# Patient Record
Sex: FEMALE | Race: WHITE | NOT HISPANIC OR LATINO | URBAN - METROPOLITAN AREA
[De-identification: names, ages, dates, MRNs, and addresses within clinical notes are randomized per-mention and may not be internally consistent; named-entity substitution may affect disease eponyms.]

---

## 2020-06-26 ENCOUNTER — OFFICE VISIT (OUTPATIENT)
Dept: URBAN - METROPOLITAN AREA CLINIC 113 | Facility: CLINIC | Age: 85
End: 2020-06-26

## 2020-07-25 ENCOUNTER — TELEPHONE ENCOUNTER (OUTPATIENT)
Dept: URBAN - METROPOLITAN AREA CLINIC 13 | Facility: CLINIC | Age: 85
End: 2020-07-25

## 2020-07-25 RX ORDER — EZETIMIBE 10 MG/1
TAKE 1 TABLET DAILY TABLET ORAL
Refills: 0 | OUTPATIENT
End: 2017-04-17

## 2020-07-25 RX ORDER — ASPIRIN 81 MG/1
TAKE 1 TABLET DAILY TABLET, DELAYED RELEASE ORAL
Refills: 0 | OUTPATIENT
End: 2019-12-30

## 2020-07-25 RX ORDER — DOCUSATE CALCIUM 240 MG
TAKE 1 CAPSULE TWICE DAILY CAPSULE ORAL
Refills: 0 | OUTPATIENT
End: 2019-10-03

## 2020-07-25 RX ORDER — DOCUSATE CALCIUM 240 MG
TAKE 1 CAPSULE DAILY PER PATIENT UNKNOWN STRENGTH CAPSULE ORAL
Refills: 0 | OUTPATIENT
End: 2015-04-23

## 2020-07-25 RX ORDER — CICLESONIDE 160 UG/1
INHALE 2 PUFFS TWICE DAILY AEROSOL, METERED RESPIRATORY (INHALATION)
Refills: 0 | OUTPATIENT
End: 2014-09-09

## 2020-07-25 RX ORDER — BISACODYL 10 MG/1
INSERT 1 SUPP 3 TIMES DAILY SUPPOSITORY RECTAL
Refills: 0 | OUTPATIENT
End: 2018-06-01

## 2020-07-25 RX ORDER — MELATONIN 3 MG
TAKE 2 CAPSULE BEDTIME PRN CAPSULE ORAL
Refills: 0 | OUTPATIENT
End: 2017-10-18

## 2020-07-25 RX ORDER — ZOLPIDEM TARTRATE 10 MG/1
TAKE TABLET  1/2 TABLET AT BEDTIME AS NEEDED TABLET, FILM COATED ORAL
Refills: 0 | OUTPATIENT
End: 2016-01-07

## 2020-07-25 RX ORDER — OMEPRAZOLE 40 MG/1
TAKE 1 CAPSULE DAILY CAPSULE, DELAYED RELEASE ORAL
Qty: 90 | Refills: 3 | OUTPATIENT
Start: 2011-09-15 | End: 2020-06-26

## 2020-07-25 RX ORDER — GABAPENTIN 300 MG/1
TAKE CAPSULE TWICE DAILY CAPSULE ORAL
Refills: 0 | OUTPATIENT
End: 2019-10-03

## 2020-07-25 RX ORDER — DIAZEPAM 5 MG/1
TAKE 1 TABLET 3 TIMES DAILY TABLET ORAL
Refills: 0 | OUTPATIENT
Start: 2019-10-02 | End: 2020-06-26

## 2020-07-25 RX ORDER — GINSENG 100 MG
TAKE 1 CAPSULE TWICE DAILY CAPSULE ORAL
Refills: 0 | OUTPATIENT
End: 2019-10-03

## 2020-07-25 RX ORDER — CALCIUM CITRATE/VITAMIN D3 315MG-6.25
TAKE 1 TABLET TWICE DAILY TABLET ORAL
Refills: 0 | OUTPATIENT
Start: 2008-09-12 | End: 2010-08-10

## 2020-07-25 RX ORDER — EZETIMIBE 10 MG/1
TAKE 0.5 TABLET DAILY TABLET ORAL
Refills: 0 | OUTPATIENT
End: 2019-10-03

## 2020-07-25 RX ORDER — PRAMIPEXOLE DIHYDROCHLORIDE 0.5 MG/1
TAKE 1 TABLET TWICE DAILY TABLET ORAL
Refills: 0 | OUTPATIENT
Start: 2017-08-11 | End: 2019-10-03

## 2020-07-25 RX ORDER — METOPROLOL TARTRATE 25 MG/1
TAKE 1 TABLET TWICE DAILY TABLET, FILM COATED ORAL
Refills: 0 | OUTPATIENT
Start: 2017-09-13 | End: 2017-12-26

## 2020-07-25 RX ORDER — PREDNISONE 50 MG/1
ONE TABLET 13 HOURS, 7 HOURS AND 1 HOUR PRIOR TO IV CONTRAST INJECTION TABLET ORAL
Qty: 3 | Refills: 0 | OUTPATIENT
Start: 2016-01-07 | End: 2016-08-15

## 2020-07-25 RX ORDER — BIOTIN 2500 MCG
TAKE 1 CAPSULE DAILY.  PT STATES UNKNOWN DOSAGE CAPSULE ORAL
Refills: 0 | OUTPATIENT
End: 2019-06-20

## 2020-07-25 RX ORDER — MULTIVITAMIN WITH IRON
TAKE 1 TABLET DAILY PT. STATES UNKNOWN DOSE TABLET ORAL
Refills: 0 | OUTPATIENT
Start: 2011-08-04 | End: 2014-05-01

## 2020-07-25 RX ORDER — METHYLCELLULOSE 2 G/10.2G
USE AS DIRECTED POWDER, FOR SOLUTION ORAL
Refills: 0 | OUTPATIENT
End: 2017-12-26

## 2020-07-25 RX ORDER — AMITRIPTYLINE HYDROCHLORIDE 75 MG/1
TAKE 1 TABLET DAILY AS DIRECTED. PER PATIENT UNKNOWN STRENGTH TABLET, FILM COATED ORAL
Refills: 0 | OUTPATIENT
Start: 2011-09-15 | End: 2015-04-23

## 2020-07-25 RX ORDER — CALCIUM CITRATE/VITAMIN D3 315MG-6.25
TAKE AS DIRECTED TABLET ORAL
Refills: 0 | OUTPATIENT
Start: 2011-09-15 | End: 2014-05-01

## 2020-07-25 RX ORDER — LOSARTAN POTASSIUM 50 MG/1
TAKE 1 TABLET DAILY TABLET, FILM COATED ORAL
Refills: 0 | OUTPATIENT
End: 2015-04-23

## 2020-07-25 RX ORDER — METHENAMINE MANDELATE 1000 MG/1
TAKE 1 TABLET EVERY TWELVE HOURS TABLET ORAL
Refills: 0 | OUTPATIENT
End: 2017-10-18

## 2020-07-25 RX ORDER — SUCRALFATE 1 G/1
TAKE 1 TABLET 4 TIMES DAILY BEFORE MEALS AND AT BEDTIME AS NEEDED TABLET ORAL
Qty: 120 | Refills: 2 | OUTPATIENT
Start: 2018-06-01 | End: 2019-06-20

## 2020-07-25 RX ORDER — MELATONIN 5 MG
TAKE 1 CAPSULE BEDTIME PRN CAPSULE ORAL
Refills: 0 | OUTPATIENT
End: 2018-06-01

## 2020-07-25 RX ORDER — PREDNISONE 10 MG/1
TAKE 1 TABLET 3 TIMES DAILY FOR 3 DAYS AND ONE ADDITIONAL TABLETET 1 HR PRIOR TO EXAM TABLET ORAL
Qty: 10 | Refills: 0 | OUTPATIENT
Start: 2014-05-01 | End: 2014-09-09

## 2020-07-25 RX ORDER — GINSENG 100 MG
TAKE AS DIRECTED CAPSULE ORAL
Refills: 0 | OUTPATIENT
End: 2017-12-26

## 2020-07-25 RX ORDER — OMEPRAZOLE 40 MG/1
TAKE 1 CAPSULE DAILY PRN CAPSULE, DELAYED RELEASE ORAL
Refills: 0 | OUTPATIENT
End: 2018-02-13

## 2020-07-25 RX ORDER — CALCIUM CITRATE/VITAMIN D3 315MG-6.25
TAKE 1 TABLET ONCE DAILY TABLET ORAL
Refills: 0 | OUTPATIENT
Start: 2008-08-27 | End: 2008-09-12

## 2020-07-25 RX ORDER — BIOTIN 5 MG
TAKE 1 CAPSULE DAILY CAPSULE ORAL
Refills: 0 | OUTPATIENT
End: 2015-04-23

## 2020-07-25 RX ORDER — DOCUSATE CALCIUM 240 MG
TAKE 1 CAPSULE DAILY CAPSULE ORAL
Refills: 0 | OUTPATIENT
End: 2017-04-17

## 2020-07-26 ENCOUNTER — TELEPHONE ENCOUNTER (OUTPATIENT)
Dept: URBAN - METROPOLITAN AREA CLINIC 13 | Facility: CLINIC | Age: 85
End: 2020-07-26

## 2020-07-26 RX ORDER — SUCRALFATE 1 G/1
TABLET ORAL
Qty: 40 | Refills: 0 | Status: ACTIVE | COMMUNITY
Start: 2018-05-11

## 2020-07-26 RX ORDER — FOSFOMYCIN TROMETHAMINE 3 G/1
POWDER ORAL
Qty: 1 | Refills: 0 | Status: ACTIVE | COMMUNITY
Start: 2016-02-16

## 2020-07-26 RX ORDER — MELOXICAM 15 MG/1
TABLET ORAL
Qty: 30 | Refills: 0 | Status: ACTIVE | COMMUNITY
Start: 2015-06-19

## 2020-07-26 RX ORDER — TETRACYCLINE HYDROCHLORIDE 250 MG/1
CAPSULE ORAL
Qty: 10 | Refills: 0 | Status: ACTIVE | COMMUNITY
Start: 2020-06-18

## 2020-07-26 RX ORDER — HYDROCODONE BITARTRATE AND HOMATROPINE METHYLBROMIDE 5; 1.5 MG/5ML; MG/5ML
SYRUP ORAL
Qty: 180 | Refills: 0 | Status: ACTIVE | COMMUNITY
Start: 2014-01-07

## 2020-07-26 RX ORDER — ALBUTEROL SULFATE 90 UG/1
AEROSOL, METERED RESPIRATORY (INHALATION)
Qty: 8 | Refills: 0 | Status: ACTIVE | COMMUNITY
Start: 2014-01-06

## 2020-07-26 RX ORDER — PRAMIPEXOLE DIHYDROCHLORIDE 1 MG/1
TABLET ORAL
Qty: 180 | Refills: 0 | Status: ACTIVE | COMMUNITY
Start: 2019-01-11

## 2020-07-26 RX ORDER — TOBRAMYCIN AND DEXAMETHASONE 3; 1 MG/G; MG/G
OINTMENT OPHTHALMIC
Qty: 4 | Refills: 0 | Status: ACTIVE | COMMUNITY
Start: 2014-04-18

## 2020-07-26 RX ORDER — ALBUTEROL SULFATE 90 UG/1
AEROSOL, METERED RESPIRATORY (INHALATION)
Qty: 18 | Refills: 0 | Status: ACTIVE | COMMUNITY
Start: 2020-06-15

## 2020-07-26 RX ORDER — HYDROCODONE BITARTRATE AND ACETAMINOPHEN 5; 325 MG/1; MG/1
TABLET ORAL
Qty: 18 | Refills: 0 | Status: ACTIVE | COMMUNITY
Start: 2020-01-13

## 2020-07-26 RX ORDER — CEPHALEXIN 500 MG/1
CAPSULE ORAL
Qty: 14 | Refills: 0 | Status: ACTIVE | COMMUNITY
Start: 2017-11-09

## 2020-07-26 RX ORDER — NYSTATIN 100000 [USP'U]/ML
SUSPENSION ORAL
Qty: 60 | Refills: 0 | Status: ACTIVE | COMMUNITY
Start: 2016-05-25

## 2020-07-26 RX ORDER — NITROGLYCERIN 0.4 MG/1
TABLET SUBLINGUAL
Qty: 25 | Refills: 0 | Status: ACTIVE | COMMUNITY
Start: 2019-03-17

## 2020-07-26 RX ORDER — TIZANIDINE 4 MG/1
TABLET ORAL
Qty: 30 | Refills: 0 | Status: ACTIVE | COMMUNITY
Start: 2016-04-12

## 2020-07-26 RX ORDER — METHENAMINE HIPPURATE 1 G/1
TABLET ORAL
Qty: 60 | Refills: 0 | Status: ACTIVE | COMMUNITY
Start: 2018-04-13

## 2020-07-26 RX ORDER — TIZANIDINE 4 MG/1
TABLET ORAL
Qty: 30 | Refills: 0 | Status: ACTIVE | COMMUNITY
Start: 2015-06-23

## 2020-07-26 RX ORDER — FAMOTIDINE 20 MG/1
TABLET ORAL
Qty: 60 | Refills: 0 | Status: ACTIVE | COMMUNITY
Start: 2018-03-14

## 2020-07-26 RX ORDER — LOTEPREDNOL ETABONATE 5 MG/ML
SUSPENSION/ DROPS OPHTHALMIC
Qty: 5 | Refills: 0 | Status: ACTIVE | COMMUNITY
Start: 2015-08-21

## 2020-07-26 RX ORDER — GABAPENTIN 100 MG/1
CAPSULE ORAL
Qty: 90 | Refills: 0 | Status: ACTIVE | COMMUNITY
Start: 2017-02-04

## 2020-07-26 RX ORDER — TRAMADOL HYDROCHLORIDE 50 MG/1
TABLET ORAL
Qty: 90 | Refills: 0 | Status: ACTIVE | COMMUNITY
Start: 2016-10-21

## 2020-07-26 RX ORDER — GABAPENTIN 100 MG/1
CAPSULE ORAL
Qty: 60 | Refills: 0 | Status: ACTIVE | COMMUNITY
Start: 2016-07-07

## 2020-07-26 RX ORDER — PANTOPRAZOLE SODIUM 40 MG/1
TABLET, DELAYED RELEASE ORAL
Qty: 30 | Refills: 0 | Status: ACTIVE | COMMUNITY
Start: 2019-03-07

## 2020-07-26 RX ORDER — PRAMIPEXOLE DIHYDROCHLORIDE 1 MG/1
TABLET ORAL
Qty: 14 | Refills: 0 | Status: ACTIVE | COMMUNITY
Start: 2019-03-28

## 2020-07-26 RX ORDER — FLUTICASONE PROPIONATE 50 UG/1
SPRAY, METERED NASAL
Qty: 16 | Refills: 0 | Status: ACTIVE | COMMUNITY
Start: 2018-09-13

## 2020-07-26 RX ORDER — CYCLOBENZAPRINE HYDROCHLORIDE 10 MG/1
TABLET, FILM COATED ORAL
Qty: 30 | Refills: 0 | Status: ACTIVE | COMMUNITY
Start: 2015-01-07

## 2020-07-26 RX ORDER — ZOLPIDEM TARTRATE 10 MG/1
TABLET, FILM COATED ORAL
Qty: 90 | Refills: 0 | Status: ACTIVE | COMMUNITY
Start: 2013-12-12

## 2020-07-26 RX ORDER — METRONIDAZOLE 7.5 MG/G
CREAM TOPICAL
Qty: 45 | Refills: 0 | Status: ACTIVE | COMMUNITY
Start: 2014-04-28

## 2020-07-26 RX ORDER — MELOXICAM 15 MG/1
TABLET ORAL
Qty: 30 | Refills: 0 | Status: ACTIVE | COMMUNITY
Start: 2016-06-27

## 2020-07-26 RX ORDER — PREDNISONE 10 MG/1
TABLET ORAL
Qty: 15 | Refills: 0 | Status: ACTIVE | COMMUNITY
Start: 2013-05-04

## 2020-07-26 RX ORDER — METHYLPREDNISOLONE 4 MG/1
TABLET ORAL
Qty: 21 | Refills: 0 | Status: ACTIVE | COMMUNITY
Start: 2016-12-05

## 2020-07-26 RX ORDER — IBUPROFEN 600 MG/1
TABLET ORAL
Qty: 20 | Refills: 0 | Status: ACTIVE | COMMUNITY
Start: 2013-09-26

## 2020-07-26 RX ORDER — AMOXICILLIN 500 MG/1
CAPSULE ORAL
Qty: 21 | Refills: 0 | Status: ACTIVE | COMMUNITY
Start: 2017-09-23

## 2020-07-26 RX ORDER — FUROSEMIDE 20 MG/1
TABLET ORAL
Qty: 90 | Refills: 0 | Status: ACTIVE | COMMUNITY
Start: 2019-06-28

## 2020-07-26 RX ORDER — ALBUTEROL SULFATE 90 UG/1
AEROSOL, METERED RESPIRATORY (INHALATION)
Qty: 8 | Refills: 0 | Status: ACTIVE | COMMUNITY
Start: 2015-04-16

## 2020-07-26 RX ORDER — FLUTICASONE PROPIONATE 50 UG/1
SPRAY, METERED NASAL
Qty: 16 | Refills: 0 | Status: ACTIVE | COMMUNITY
Start: 2013-05-06

## 2020-07-26 RX ORDER — NITROFURANTOIN MONOHYDRATE/MACROCRYSTALLINE 25; 75 MG/1; MG/1
CAPSULE ORAL
Qty: 14 | Refills: 0 | Status: ACTIVE | COMMUNITY
Start: 2013-10-25

## 2020-07-26 RX ORDER — LOSARTAN POTASSIUM AND HYDROCHLOROTHIAZIDE 50; 12.5 MG/1; MG/1
TABLET, FILM COATED ORAL
Qty: 90 | Refills: 0 | Status: ACTIVE | COMMUNITY
Start: 2013-10-12

## 2020-07-26 RX ORDER — TIZANIDINE 4 MG/1
TABLET ORAL
Qty: 30 | Refills: 0 | Status: ACTIVE | COMMUNITY
Start: 2017-02-15

## 2020-07-26 RX ORDER — AMOXICILLIN 500 MG/1
CAPSULE ORAL
Qty: 21 | Refills: 0 | Status: ACTIVE | COMMUNITY
Start: 2017-04-09

## 2020-07-26 RX ORDER — NITROFURANTOIN MONOHYDRATE/MACROCRYSTALLINE 25; 75 MG/1; MG/1
CAPSULE ORAL
Qty: 28 | Refills: 0 | Status: ACTIVE | COMMUNITY
Start: 2016-05-03

## 2020-07-26 RX ORDER — IPRATROPIUM BROMIDE 42 UG/1
SPRAY, METERED NASAL
Qty: 45 | Refills: 0 | Status: ACTIVE | COMMUNITY
Start: 2020-02-11

## 2020-07-26 RX ORDER — SPIRONOLACTONE 25 MG/1
TABLET ORAL
Qty: 30 | Refills: 0 | Status: ACTIVE | COMMUNITY
Start: 2019-06-20

## 2020-07-26 RX ORDER — DICLOFENAC SODIUM 10 MG/G
GEL TOPICAL
Qty: 100 | Refills: 0 | Status: ACTIVE | COMMUNITY
Start: 2019-09-20

## 2020-07-26 RX ORDER — FLUCONAZOLE 100 MG/1
TABLET ORAL
Qty: 8 | Refills: 0 | Status: ACTIVE | COMMUNITY
Start: 2015-04-03

## 2020-07-26 RX ORDER — DOXYCYCLINE 100 MG/1
CAPSULE ORAL
Qty: 14 | Refills: 0 | Status: ACTIVE | COMMUNITY
Start: 2016-04-15

## 2020-07-26 RX ORDER — LEVOTHYROXINE SODIUM 25 UG/1
TABLET ORAL
Qty: 30 | Refills: 0 | Status: ACTIVE | COMMUNITY
Start: 2019-12-13

## 2020-07-26 RX ORDER — NITROFURANTOIN MONOHYDRATE/MACROCRYSTALLINE 25; 75 MG/1; MG/1
CAPSULE ORAL
Qty: 14 | Refills: 0 | Status: ACTIVE | COMMUNITY
Start: 2015-03-06

## 2020-07-26 RX ORDER — METHOCARBAMOL 500 MG/1
TABLET, FILM COATED ORAL
Qty: 30 | Refills: 0 | Status: ACTIVE | COMMUNITY
Start: 2018-11-05

## 2020-07-26 RX ORDER — METOPROLOL TARTRATE 25 MG/1
TAKE 0.5 TABLET TWICE DAILY TABLET, FILM COATED ORAL
Refills: 0 | Status: ACTIVE | COMMUNITY

## 2020-07-26 RX ORDER — CLINDAMYCIN HYDROCHLORIDE 300 MG/1
CAPSULE ORAL
Qty: 12 | Refills: 0 | Status: ACTIVE | COMMUNITY
Start: 2018-03-21

## 2020-07-26 RX ORDER — HYDROCODONE BITARTRATE AND ACETAMINOPHEN 5; 325 MG/1; MG/1
TABLET ORAL
Qty: 12 | Refills: 0 | Status: ACTIVE | COMMUNITY
Start: 2018-11-01

## 2020-07-26 RX ORDER — ACETAMINOPHEN AND CODEINE PHOSPHATE 300; 30 MG/1; MG/1
TABLET ORAL
Qty: 24 | Refills: 0 | Status: ACTIVE | COMMUNITY
Start: 2019-03-13

## 2020-07-26 RX ORDER — CICLESONIDE 160 UG/1
AEROSOL, METERED RESPIRATORY (INHALATION)
Qty: 6 | Refills: 0 | Status: ACTIVE | COMMUNITY
Start: 2013-06-10

## 2020-07-26 RX ORDER — NITROFURANTOIN MONOHYDRATE/MACROCRYSTALLINE 25; 75 MG/1; MG/1
CAPSULE ORAL
Qty: 14 | Refills: 0 | Status: ACTIVE | COMMUNITY
Start: 2017-04-06

## 2020-07-26 RX ORDER — DOXYCYCLINE HYCLATE 100 MG/1
TABLET ORAL
Qty: 20 | Refills: 0 | Status: ACTIVE | COMMUNITY
Start: 2015-10-03

## 2020-07-26 RX ORDER — FOLIC ACID-PYRIDOXINE-CYANOCOBALAMIN TAB 2.5-25-2 MG 2.5-25-2 MG
TAB ORAL
Qty: 90 | Refills: 0 | Status: ACTIVE | COMMUNITY
Start: 2013-07-14

## 2020-07-26 RX ORDER — DIPHENOXYLATE HYDROCHLORIDE AND ATROPINE SULFATE 2.5; .025 MG/1; MG/1
TABLET ORAL
Qty: 12 | Refills: 0 | Status: ACTIVE | COMMUNITY
Start: 2017-12-26

## 2020-07-26 RX ORDER — HYDROCODONE BITARTRATE AND ACETAMINOPHEN 5; 325 MG/1; MG/1
TABLET ORAL
Qty: 12 | Refills: 0 | Status: ACTIVE | COMMUNITY
Start: 2013-09-26

## 2020-07-26 RX ORDER — CONJUGATED ESTROGENS 0.62 MG/G
CREAM VAGINAL
Qty: 30 | Refills: 0 | Status: ACTIVE | COMMUNITY
Start: 2015-03-09

## 2020-07-26 RX ORDER — METOPROLOL SUCCINATE 25 MG/1
TABLET, EXTENDED RELEASE ORAL
Qty: 45 | Refills: 0 | Status: ACTIVE | COMMUNITY
Start: 2017-12-04

## 2020-07-26 RX ORDER — OSELTAMIVIR PHOSPHATE 75 MG
CAPSULE ORAL
Qty: 10 | Refills: 0 | Status: ACTIVE | COMMUNITY
Start: 2014-11-20

## 2020-07-26 RX ORDER — LEVOTHYROXINE SODIUM 25 UG/1
TAKE 1 TABLET DAILY AS DIRECTED TABLET ORAL
Refills: 0 | Status: ACTIVE | COMMUNITY
Start: 2020-05-08

## 2020-07-26 RX ORDER — CYCLOBENZAPRINE HYDROCHLORIDE 10 MG/1
TABLET, FILM COATED ORAL
Qty: 90 | Refills: 0 | Status: ACTIVE | COMMUNITY
Start: 2019-09-02

## 2020-07-26 RX ORDER — GABAPENTIN 400 MG/1
CAPSULE ORAL
Qty: 20 | Refills: 0 | Status: ACTIVE | COMMUNITY
Start: 2017-03-16

## 2020-07-26 RX ORDER — CEPHALEXIN 500 MG/1
CAPSULE ORAL
Qty: 14 | Refills: 0 | Status: ACTIVE | COMMUNITY
Start: 2017-10-23

## 2020-07-26 RX ORDER — AMOXICILLIN 500 MG/1
CAPSULE ORAL
Qty: 30 | Refills: 0 | Status: ACTIVE | COMMUNITY
Start: 2016-01-16

## 2020-07-26 RX ORDER — FOLIC ACID-PYRIDOXINE-CYANOCOBALAMIN TAB 2.5-25-2 MG 2.5-25-2 MG
TAB ORAL
Qty: 90 | Refills: 0 | Status: ACTIVE | COMMUNITY
Start: 2013-10-14

## 2020-07-26 RX ORDER — BACITRACIN 500 [USP'U]/G
OINTMENT TOPICAL
Qty: 4 | Refills: 0 | Status: ACTIVE | COMMUNITY
Start: 2015-03-20

## 2020-07-26 RX ORDER — HYDROCODONE BITARTRATE AND HOMATROPINE METHYLBROMIDE 5; 1.5 MG/5ML; MG/5ML
SYRUP ORAL
Qty: 120 | Refills: 0 | Status: ACTIVE | COMMUNITY
Start: 2014-01-04

## 2020-07-26 RX ORDER — AMOXICILLIN AND CLAVULANATE POTASSIUM 875; 125 MG/1; MG/1
TABLET, FILM COATED ORAL
Qty: 14 | Refills: 0 | Status: ACTIVE | COMMUNITY
Start: 2014-01-02

## 2020-07-26 RX ORDER — METRONIDAZOLE 500 MG/1
TABLET ORAL
Qty: 14 | Refills: 0 | Status: ACTIVE | COMMUNITY
Start: 2016-06-02

## 2020-07-26 RX ORDER — ZOLPIDEM TARTRATE 10 MG/1
TABLET, FILM COATED ORAL
Qty: 15 | Refills: 0 | Status: ACTIVE | COMMUNITY
Start: 2013-05-21

## 2020-07-26 RX ORDER — PRAMIPEXOLE DIHYDROCHLORIDE 1 MG/1
TABLET ORAL
Qty: 180 | Refills: 0 | Status: ACTIVE | COMMUNITY
Start: 2018-10-22

## 2020-07-26 RX ORDER — FLUTICASONE PROPIONATE 50 UG/1
SPRAY, METERED NASAL
Qty: 16 | Refills: 0 | Status: ACTIVE | COMMUNITY
Start: 2019-06-11

## 2020-07-26 RX ORDER — NYSTATIN 100000 [USP'U]/ML
SUSPENSION ORAL
Qty: 140 | Refills: 0 | Status: ACTIVE | COMMUNITY
Start: 2013-08-26

## 2020-07-26 RX ORDER — METHENAMINE HIPPURATE 1 G/1
TABLET ORAL
Qty: 60 | Refills: 0 | Status: ACTIVE | COMMUNITY
Start: 2016-05-31

## 2020-07-26 RX ORDER — CEPHALEXIN 500 MG/1
CAPSULE ORAL
Qty: 20 | Refills: 0 | Status: ACTIVE | COMMUNITY
Start: 2016-04-06

## 2020-07-26 RX ORDER — ROPINIROLE 1 MG/1
TABLET, FILM COATED ORAL
Qty: 30 | Refills: 0 | Status: ACTIVE | COMMUNITY
Start: 2015-04-13

## 2020-07-26 RX ORDER — METOPROLOL SUCCINATE 25 MG/1
TABLET, EXTENDED RELEASE ORAL
Qty: 45 | Refills: 0 | Status: ACTIVE | COMMUNITY
Start: 2018-03-01

## 2020-07-26 RX ORDER — ONDANSETRON HYDROCHLORIDE 4 MG/1
TABLET, FILM COATED ORAL
Qty: 10 | Refills: 0 | Status: ACTIVE | COMMUNITY
Start: 2016-06-02

## 2020-07-26 RX ORDER — ATORVASTATIN CALCIUM 20 MG/1
TABLET, FILM COATED ORAL
Qty: 30 | Refills: 0 | Status: ACTIVE | COMMUNITY
Start: 2017-09-13

## 2020-07-26 RX ORDER — TOBRAMYCIN AND DEXAMETHASONE 3; 1 MG/ML; MG/ML
SUSPENSION/ DROPS OPHTHALMIC
Qty: 5 | Refills: 0 | Status: ACTIVE | COMMUNITY
Start: 2017-03-09

## 2020-07-26 RX ORDER — NITROFURANTOIN MONOHYDRATE/MACROCRYSTALLINE 25; 75 MG/1; MG/1
CAPSULE ORAL
Qty: 14 | Refills: 0 | Status: ACTIVE | COMMUNITY
Start: 2020-06-19

## 2020-07-26 RX ORDER — TIZANIDINE 4 MG/1
TABLET ORAL
Qty: 60 | Refills: 0 | Status: ACTIVE | COMMUNITY
Start: 2015-08-28

## 2020-07-26 RX ORDER — TIZANIDINE 2 MG/1
TABLET ORAL
Qty: 30 | Refills: 0 | Status: ACTIVE | COMMUNITY
Start: 2019-03-07

## 2020-07-26 RX ORDER — DEXAMETHASONE SODIUM PHOSPHATE 0.1 %
DROPS OPHTHALMIC (EYE)
Qty: 5 | Refills: 0 | Status: ACTIVE | COMMUNITY
Start: 2015-05-13

## 2020-07-26 RX ORDER — TIZANIDINE 4 MG/1
TABLET ORAL
Qty: 30 | Refills: 0 | Status: ACTIVE | COMMUNITY
Start: 2015-04-08

## 2020-07-26 RX ORDER — LOSARTAN POTASSIUM AND HYDROCHLOROTHIAZIDE 50; 12.5 MG/1; MG/1
TABLET, FILM COATED ORAL
Qty: 90 | Refills: 0 | Status: ACTIVE | COMMUNITY
Start: 2014-07-15

## 2020-07-26 RX ORDER — ALBUTEROL SULFATE 90 UG/1
AEROSOL, METERED RESPIRATORY (INHALATION)
Qty: 8 | Refills: 0 | Status: ACTIVE | COMMUNITY
Start: 2019-02-12

## 2020-07-26 RX ORDER — BACLOFEN 10 MG/1
TABLET ORAL
Qty: 60 | Refills: 0 | Status: ACTIVE | COMMUNITY
Start: 2016-06-29

## 2020-07-26 RX ORDER — OSELTAMIVIR PHOSPHATE 75 MG
CAPSULE ORAL
Qty: 10 | Refills: 0 | Status: ACTIVE | COMMUNITY
Start: 2015-01-06

## 2020-07-26 RX ORDER — MUPIROCIN 20 MG/G
OINTMENT TOPICAL
Qty: 22 | Refills: 0 | Status: ACTIVE | COMMUNITY
Start: 2017-03-16

## 2020-07-26 RX ORDER — PENICILLIN V POTASSIUM 500 MG/1
TABLET, FILM COATED ORAL
Qty: 28 | Refills: 0 | Status: ACTIVE | COMMUNITY
Start: 2020-01-13

## 2020-07-26 RX ORDER — DOXYCYCLINE HYCLATE 100 MG/1
TABLET ORAL
Qty: 7 | Refills: 0 | Status: ACTIVE | COMMUNITY
Start: 2014-04-28

## 2020-07-26 RX ORDER — HYDROCODONE BITARTRATE AND ACETAMINOPHEN 5; 325 MG/1; MG/1
TABLET ORAL
Qty: 40 | Refills: 0 | Status: ACTIVE | COMMUNITY
Start: 2019-10-28

## 2020-07-26 RX ORDER — AMOXICILLIN 875 MG/1
TABLET, FILM COATED ORAL
Qty: 20 | Refills: 0 | Status: ACTIVE | COMMUNITY
Start: 2017-03-24

## 2020-07-26 RX ORDER — PRAMIPEXOLE DIHYDROCHLORIDE 1 MG/1
TAKE 1 TABLET TWICE DAILY TABLET ORAL
Refills: 0 | Status: ACTIVE | COMMUNITY
Start: 2019-11-28

## 2020-07-26 RX ORDER — CEPHALEXIN 500 MG/1
TAKE 1 CAPSULE BY MOUTH EVERY 6 HOURS CAPSULE ORAL
Qty: 28 | Refills: 0 | Status: ACTIVE | COMMUNITY
Start: 2019-09-01

## 2020-07-26 RX ORDER — DOXYCYCLINE HYCLATE 100 MG/1
TABLET ORAL
Qty: 14 | Refills: 0 | Status: ACTIVE | COMMUNITY
Start: 2015-01-07

## 2020-07-26 RX ORDER — ONDANSETRON 4 MG/1
TABLET, ORALLY DISINTEGRATING ORAL
Qty: 10 | Refills: 0 | Status: ACTIVE | COMMUNITY
Start: 2018-05-11

## 2020-07-26 RX ORDER — CEFUROXIME AXETIL 500 MG/1
TABLET, FILM COATED ORAL
Qty: 20 | Refills: 0 | Status: ACTIVE | COMMUNITY
Start: 2018-08-30

## 2020-07-26 RX ORDER — EZETIMIBE 10 MG/1
TAKE 1/2 TABLET DAILY TABLET ORAL
Refills: 0 | Status: ACTIVE | COMMUNITY

## 2020-07-26 RX ORDER — ONDANSETRON HYDROCHLORIDE 4 MG/1
TABLET, FILM COATED ORAL
Qty: 20 | Refills: 0 | Status: ACTIVE | COMMUNITY
Start: 2017-03-16

## 2020-07-26 RX ORDER — HYDROCODONE BITARTRATE AND ACETAMINOPHEN 5; 325 MG/1; MG/1
TABLET ORAL
Qty: 12 | Refills: 0 | Status: ACTIVE | COMMUNITY
Start: 2019-03-07

## 2020-07-26 RX ORDER — DOXYCYCLINE HYCLATE 100 MG/1
TABLET ORAL
Qty: 14 | Refills: 0 | Status: ACTIVE | COMMUNITY
Start: 2013-07-05

## 2020-07-26 RX ORDER — AZELASTINE HYDROCHLORIDE 137 UG/1
SPRAY, METERED NASAL
Qty: 30 | Refills: 0 | Status: ACTIVE | COMMUNITY
Start: 2018-09-13

## 2020-07-26 RX ORDER — AZELASTINE HYDROCHLORIDE 137 UG/1
SPRAY, METERED NASAL
Qty: 30 | Refills: 0 | Status: ACTIVE | COMMUNITY
Start: 2015-01-21

## 2020-07-26 RX ORDER — DOXYCYCLINE 100 MG/1
CAPSULE ORAL
Qty: 20 | Refills: 0 | Status: ACTIVE | COMMUNITY
Start: 2014-01-04

## 2020-07-26 RX ORDER — BUTALBITAL, ACETAMINOPHEN, AND CAFFEINE 50; 325; 40 MG/1; MG/1; MG/1
TABLET ORAL
Qty: 12 | Refills: 0 | Status: ACTIVE | COMMUNITY
Start: 2017-03-24

## 2020-07-26 RX ORDER — GABAPENTIN 100 MG/1
CAPSULE ORAL
Qty: 90 | Refills: 0 | Status: ACTIVE | COMMUNITY
Start: 2017-03-23

## 2020-07-26 RX ORDER — METOPROLOL SUCCINATE 25 MG/1
TABLET, EXTENDED RELEASE ORAL
Qty: 45 | Refills: 0 | Status: ACTIVE | COMMUNITY
Start: 2017-08-12

## 2020-07-26 RX ORDER — METHENAMINE HIPPURATE 1 G/1
TABLET ORAL
Qty: 180 | Refills: 0 | Status: ACTIVE | COMMUNITY
Start: 2017-02-03

## 2020-07-26 RX ORDER — LOSARTAN POTASSIUM AND HYDROCHLOROTHIAZIDE 50; 12.5 MG/1; MG/1
TABLET, FILM COATED ORAL
Qty: 90 | Refills: 0 | Status: ACTIVE | COMMUNITY
Start: 2013-06-18

## 2020-07-26 RX ORDER — ALBUTEROL SULFATE 90 UG/1
AEROSOL, METERED RESPIRATORY (INHALATION)
Qty: 54 | Refills: 0 | Status: ACTIVE | COMMUNITY
Start: 2016-02-27

## 2020-07-26 RX ORDER — NEOMYCIN AND POLYMYXIN B SULFATES AND DEXAMETHASONE 1; 3.5; 1 MG/G; MG/G; [IU]/G
OINTMENT OPHTHALMIC
Qty: 4 | Refills: 0 | Status: ACTIVE | COMMUNITY
Start: 2018-12-17

## 2020-07-26 RX ORDER — NYSTATIN 100000 [USP'U]/ML
SUSPENSION ORAL
Qty: 120 | Refills: 0 | Status: ACTIVE | COMMUNITY
Start: 2018-09-16

## 2020-07-26 RX ORDER — METHYLPREDNISOLONE 4 MG/1
TABLET ORAL
Qty: 21 | Refills: 0 | Status: ACTIVE | COMMUNITY
Start: 2019-09-09

## 2020-07-26 RX ORDER — AMLODIPINE BESYLATE 5 MG/1
TABLET ORAL
Qty: 30 | Refills: 0 | Status: ACTIVE | COMMUNITY
Start: 2015-03-09

## 2020-07-26 RX ORDER — NITROFURANTOIN MONOHYDRATE/MACROCRYSTALLINE 25; 75 MG/1; MG/1
CAPSULE ORAL
Qty: 14 | Refills: 0 | Status: ACTIVE | COMMUNITY
Start: 2019-10-09

## 2020-07-26 RX ORDER — ONDANSETRON HYDROCHLORIDE 4 MG/1
TABLET, FILM COATED ORAL
Qty: 6 | Refills: 0 | Status: ACTIVE | COMMUNITY
Start: 2020-01-13

## 2020-07-26 RX ORDER — ALBUTEROL SULFATE 90 UG/1
AEROSOL, METERED RESPIRATORY (INHALATION)
Qty: 18 | Refills: 0 | Status: ACTIVE | COMMUNITY
Start: 2019-03-29

## 2020-07-26 RX ORDER — CEPHALEXIN 500 MG/1
CAPSULE ORAL
Qty: 14 | Refills: 0 | Status: ACTIVE | COMMUNITY
Start: 2017-09-30

## 2020-07-26 RX ORDER — DOXYCYCLINE HYCLATE 100 MG/1
TABLET ORAL
Qty: 6 | Refills: 0 | Status: ACTIVE | COMMUNITY
Start: 2017-05-23

## 2020-07-26 RX ORDER — TOBRAMYCIN 3 MG/ML
SOLUTION/ DROPS OPHTHALMIC
Qty: 5 | Refills: 0 | Status: ACTIVE | COMMUNITY
Start: 2019-08-24

## 2020-07-26 RX ORDER — DESMOPRESSIN ACETATE 0.1 MG/1
TABLET ORAL
Qty: 30 | Refills: 0 | Status: ACTIVE | COMMUNITY
Start: 2016-04-06

## 2020-07-26 RX ORDER — NITROFURANTOIN MONOHYDRATE/MACROCRYSTALLINE 25; 75 MG/1; MG/1
CAPSULE ORAL
Qty: 14 | Refills: 0 | Status: ACTIVE | COMMUNITY
Start: 2014-02-24

## 2020-07-26 RX ORDER — AZELASTINE 137 UG/1
SPRAY, METERED NASAL
Qty: 30 | Refills: 0 | Status: ACTIVE | COMMUNITY
Start: 2014-02-27

## 2020-07-26 RX ORDER — FUROSEMIDE 20 MG/1
TABLET ORAL
Qty: 5 | Refills: 0 | Status: ACTIVE | COMMUNITY
Start: 2019-03-13

## 2020-07-26 RX ORDER — NEOMYCIN SULFATE, POLYMYXIN B SULFATE AND DEXAMETHASONE 3.5; 10000; 1 MG/ML; [USP'U]/ML; MG/ML
SUSPENSION/ DROPS OPHTHALMIC
Qty: 5 | Refills: 0 | Status: ACTIVE | COMMUNITY
Start: 2015-04-07

## 2020-07-26 RX ORDER — AMOXICILLIN 875 MG/1
TABLET, FILM COATED ORAL
Qty: 14 | Refills: 0 | Status: ACTIVE | COMMUNITY
Start: 2019-01-08

## 2020-07-26 RX ORDER — AMOXICILLIN 500 MG/1
CAPSULE ORAL
Qty: 20 | Refills: 0 | Status: ACTIVE | COMMUNITY
Start: 2014-09-23

## 2020-07-26 RX ORDER — CARBAMAZEPINE 100 MG/1
CAPSULE, EXTENDED RELEASE ORAL
Qty: 120 | Refills: 0 | Status: ACTIVE | COMMUNITY
Start: 2015-06-05

## 2020-07-26 RX ORDER — ALBUTEROL SULFATE 2.5 MG/3ML
INHALE THE CONTENTS OF 1 VIAL VIA NEBULIZER Q 4 H PRF SOB SOLUTION RESPIRATORY (INHALATION)
Qty: 75 | Refills: 0 | Status: ACTIVE | COMMUNITY
Start: 2020-05-17

## 2020-07-26 RX ORDER — NITROFURANTOIN MONOHYDRATE/MACROCRYSTALLINE 25; 75 MG/1; MG/1
CAPSULE ORAL
Qty: 14 | Refills: 0 | Status: ACTIVE | COMMUNITY
Start: 2018-04-06

## 2020-07-26 RX ORDER — METRONIDAZOLE 7.5 MG/G
CREAM TOPICAL
Qty: 45 | Refills: 0 | Status: ACTIVE | COMMUNITY
Start: 2015-03-31

## 2020-07-26 RX ORDER — ACETAMINOPHEN AND CODEINE PHOSPHATE 300; 30 MG/1; MG/1
TK 1 T PO Q 4 H PRN P TABLET ORAL
Qty: 10 | Refills: 0 | Status: ACTIVE | COMMUNITY
Start: 2018-10-29

## 2020-07-26 RX ORDER — AMOXICILLIN 875 MG/1
TABLET, FILM COATED ORAL
Qty: 20 | Refills: 0 | Status: ACTIVE | COMMUNITY
Start: 2016-02-24

## 2020-09-04 ENCOUNTER — OFFICE VISIT (OUTPATIENT)
Dept: URBAN - METROPOLITAN AREA CLINIC 113 | Facility: CLINIC | Age: 85
End: 2020-09-04
Payer: MEDICARE

## 2020-09-04 VITALS
HEIGHT: 69 IN | BODY MASS INDEX: 25.18 KG/M2 | RESPIRATION RATE: 18 BRPM | WEIGHT: 170 LBS | TEMPERATURE: 98.1 F | HEART RATE: 66 BPM | DIASTOLIC BLOOD PRESSURE: 78 MMHG | SYSTOLIC BLOOD PRESSURE: 132 MMHG

## 2020-09-04 DIAGNOSIS — K21.9 GASTROESOPHAGEAL REFLUX DISEASE, ESOPHAGITIS PRESENCE NOT SPECIFIED: ICD-10-CM

## 2020-09-04 DIAGNOSIS — Z86.19 HISTORY OF CLOSTRIDIOIDES DIFFICILE INFECTION: ICD-10-CM

## 2020-09-04 DIAGNOSIS — K59.09 CHRONIC CONSTIPATION: ICD-10-CM

## 2020-09-04 PROCEDURE — 99213 OFFICE O/P EST LOW 20 MIN: CPT | Performed by: NURSE PRACTITIONER

## 2020-09-04 PROCEDURE — G8427 DOCREV CUR MEDS BY ELIG CLIN: HCPCS | Performed by: NURSE PRACTITIONER

## 2020-09-04 RX ORDER — OMEPRAZOLE 40 MG/1
1 CAPSULE 30 MINUTES BEFORE MORNING MEAL CAPSULE, DELAYED RELEASE ORAL ONCE A DAY
Status: ACTIVE | COMMUNITY

## 2020-09-04 RX ORDER — PRAMIPEXOLE DIHYDROCHLORIDE 1 MG/1
1 TABLET TABLET ORAL ONCE A DAY
Refills: 0 | Status: ACTIVE | COMMUNITY
Start: 2018-10-22

## 2020-09-04 RX ORDER — LEVOTHYROXINE SODIUM 25 UG/1
1 TABLET IN THE MORNING ON AN EMPTY STOMACH TABLET ORAL ONCE A DAY
Refills: 0 | Status: ACTIVE | COMMUNITY
Start: 2019-12-13

## 2020-09-04 RX ORDER — METOPROLOL TARTRATE 25 MG/1
1/2  TABLET WITH FOOD TABLET, FILM COATED ORAL TWICE A DAY
Refills: 0 | Status: ACTIVE | COMMUNITY

## 2020-09-04 RX ORDER — ALBUTEROL SULFATE 2.5 MG/3ML
3 ML AS NEEDED SOLUTION RESPIRATORY (INHALATION)
Refills: 0 | Status: ACTIVE | COMMUNITY
Start: 2020-05-17

## 2020-09-04 RX ORDER — ALBUTEROL SULFATE 90 UG/1
AEROSOL, METERED RESPIRATORY (INHALATION)
Qty: 8 | Refills: 0 | Status: ACTIVE | COMMUNITY
Start: 2019-02-12

## 2020-09-04 NOTE — HPI-TODAY'S VISIT:
Ms. 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 diff. She states she recently underwent Botox injections with her bladder and had a hernia repair.  In early August, she was treated for C. difficile and reports her husbuand had C. diff as well.  She completed treatment with an antibiotic prescribed every 6 hours on 8/27/2020.  Bowel habits have improved; she is having 1 or 2 loose bowel movement every day.  She is no longer taking MiraLAX.  Heartburn is controlled with medication.  She denies abdominal pain, red blood per rectum, or any other abdominal symptoms.  Note reviewed from Dr. Bowden dated 8/31/2020 following her visit.  This note indicated refractory C. Diff and an Rx for Dificid. Mrs. Anne was called 9/6/2020 to clarify whether or not she was taking additional therapy. She denies a stool study after she completed therapy and does not recall completing one prior to initiation of therapy.  She is not taking Dificid and reports a single formed stool today.

## 2020-10-14 ENCOUNTER — OFFICE VISIT (OUTPATIENT)
Dept: URBAN - METROPOLITAN AREA CLINIC 113 | Facility: CLINIC | Age: 85
End: 2020-10-14
Payer: MEDICARE

## 2020-10-14 VITALS
HEIGHT: 69 IN | DIASTOLIC BLOOD PRESSURE: 64 MMHG | TEMPERATURE: 98.2 F | WEIGHT: 178 LBS | RESPIRATION RATE: 20 BRPM | BODY MASS INDEX: 26.36 KG/M2 | HEART RATE: 58 BPM | SYSTOLIC BLOOD PRESSURE: 111 MMHG

## 2020-10-14 DIAGNOSIS — K21.9 GASTROESOPHAGEAL REFLUX DISEASE WITHOUT ESOPHAGITIS: ICD-10-CM

## 2020-10-14 PROCEDURE — 99203 OFFICE O/P NEW LOW 30 MIN: CPT | Performed by: INTERNAL MEDICINE

## 2020-10-14 RX ORDER — PRAMIPEXOLE DIHYDROCHLORIDE 1 MG/1
1 TABLET TABLET ORAL ONCE A DAY
Refills: 0 | Status: ACTIVE | COMMUNITY
Start: 2018-10-22

## 2020-10-14 RX ORDER — OMEPRAZOLE 40 MG/1
1 CAPSULE 30 MINUTES BEFORE MORNING MEAL CAPSULE, DELAYED RELEASE ORAL ONCE A DAY
Status: ACTIVE | COMMUNITY

## 2020-10-14 RX ORDER — OMEPRAZOLE 20 MG/1
1 CAPSULE 30 MINUTES BEFORE MORNING MEAL CAPSULE, DELAYED RELEASE ORAL ONCE A DAY
Qty: 90 | Refills: 3 | OUTPATIENT
Start: 2020-10-14

## 2020-10-14 RX ORDER — ALBUTEROL SULFATE 2.5 MG/3ML
3 ML AS NEEDED SOLUTION RESPIRATORY (INHALATION)
Refills: 0 | Status: ACTIVE | COMMUNITY
Start: 2020-05-17

## 2020-10-14 RX ORDER — METOPROLOL TARTRATE 25 MG/1
1/2  TABLET WITH FOOD TABLET, FILM COATED ORAL TWICE A DAY
Refills: 0 | Status: ACTIVE | COMMUNITY

## 2020-10-14 RX ORDER — LEVOTHYROXINE SODIUM 25 UG/1
1 TABLET IN THE MORNING ON AN EMPTY STOMACH TABLET ORAL ONCE A DAY
Refills: 0 | Status: ACTIVE | COMMUNITY
Start: 2019-12-13

## 2020-10-14 NOTE — HPI-TODAY'S VISIT:
Ms. 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 She was seen on December 30, 2019 for follow-up of an exacerbation of constipation which had resulted in hospitalization in [place], [place] in fall 2019 due to concern for a bowel obstruction. She had abdominal x-rays performed at the time of admission, ultimately completing a bowel purge with enemas. Her gabapentin was discontinued, and she was started on MiraLAX, which has been effective when used in combination with regular prune intake.  At her last visit, she was having 2-6 soft, occasionally loose stools each day. No blood per rectum. She reported associated excess gas and nausea, but denies vomiting. She also described some vague right sided abdominal pain, not affected by food ingestion or defecation. She is not certain whether this relates in some fashion to some compression fractures of her lower back, which had been recently remediated with injection kyphoplasty. Her reflux symptoms were controlled with daily use of omeprazole. Her weight was stable. A CT scan of the abdomen and pelvis was done on February 8, 2020 and revealed thoracolumbar levoscoliosis, spondylosis, a T11 vertebral wedge compression deformity, and a metallic right hip prosthesis. A midline ventral hernia was seen containing fat as well as the mid transverse colon in the epigastric area without CT findings of hernia strangulation related GI obstruction. No other abnormalities were noted. She was status post a right hemicolectomy. The patient returned on 6/26/20  with ongoing abdominal discomfort related to abdominal wall hernias. She felt that this was likely the source of her intermittent pain. Her bowels were moving regularly on MiraLAX and prune juice, and reflux symptoms were controlled with omeprazole. Plan 1. Referral to Dr. Jessee Carey for hernia repair 2. Continue MiraLAX and prune juice for constipation 3. Continue omeprazole for reflux 4. Follow up in 3 months The patient had abdominal wall hernia repair undertaken by Dr. Carey.  He apparently found "9 hernias" surgery.  She has no specific acute complaints today.  She stopped omeprazole after her last office visit, and began taking sucralfate because of concerns over long-term PPI therapy.  However, she has begun having burning epigastric pain once again, often postprandially.  This is her major complaint today.

## 2020-10-17 PROBLEM — 266435005: Status: ACTIVE | Noted: 2020-10-14

## 2020-12-11 ENCOUNTER — WEB ENCOUNTER (OUTPATIENT)
Dept: URBAN - METROPOLITAN AREA CLINIC 113 | Facility: CLINIC | Age: 85
End: 2020-12-11

## 2020-12-11 ENCOUNTER — OFFICE VISIT (OUTPATIENT)
Dept: URBAN - METROPOLITAN AREA CLINIC 113 | Facility: CLINIC | Age: 85
End: 2020-12-11
Payer: MEDICARE

## 2020-12-11 ENCOUNTER — OFFICE VISIT (OUTPATIENT)
Dept: URBAN - METROPOLITAN AREA CLINIC 113 | Facility: CLINIC | Age: 85
End: 2020-12-11

## 2020-12-11 VITALS
BODY MASS INDEX: 27.25 KG/M2 | RESPIRATION RATE: 20 BRPM | SYSTOLIC BLOOD PRESSURE: 119 MMHG | HEIGHT: 69 IN | DIASTOLIC BLOOD PRESSURE: 68 MMHG | TEMPERATURE: 98.3 F | HEART RATE: 70 BPM | WEIGHT: 184 LBS

## 2020-12-11 DIAGNOSIS — K59.09 CHRONIC CONSTIPATION: ICD-10-CM

## 2020-12-11 DIAGNOSIS — K21.9 GASTROESOPHAGEAL REFLUX DISEASE, ESOPHAGITIS PRESENCE NOT SPECIFIED: ICD-10-CM

## 2020-12-11 PROCEDURE — 99213 OFFICE O/P EST LOW 20 MIN: CPT | Performed by: NURSE PRACTITIONER

## 2020-12-11 PROCEDURE — G8427 DOCREV CUR MEDS BY ELIG CLIN: HCPCS | Performed by: NURSE PRACTITIONER

## 2020-12-11 PROCEDURE — G9903 PT SCRN TBCO ID AS NON USER: HCPCS | Performed by: NURSE PRACTITIONER

## 2020-12-11 RX ORDER — LEVOTHYROXINE SODIUM 25 UG/1
1 TABLET IN THE MORNING ON AN EMPTY STOMACH TABLET ORAL ONCE A DAY
Refills: 0 | Status: ACTIVE | COMMUNITY
Start: 2019-12-13

## 2020-12-11 RX ORDER — OMEPRAZOLE 20 MG/1
1 CAPSULE 30 MINUTES BEFORE MORNING MEAL CAPSULE, DELAYED RELEASE ORAL ONCE A DAY
Status: ACTIVE | COMMUNITY

## 2020-12-11 RX ORDER — METOPROLOL TARTRATE 25 MG/1
1/2  TABLET WITH FOOD TABLET, FILM COATED ORAL TWICE A DAY
Refills: 0 | Status: ACTIVE | COMMUNITY

## 2020-12-11 RX ORDER — PRAMIPEXOLE DIHYDROCHLORIDE 1 MG/1
1 TABLET TABLET ORAL ONCE A DAY
Refills: 0 | Status: ACTIVE | COMMUNITY
Start: 2018-10-22

## 2020-12-11 RX ORDER — OMEPRAZOLE 40 MG/1
1 CAPSULE 30 MINUTES BEFORE MORNING MEAL CAPSULE, DELAYED RELEASE ORAL ONCE A DAY
Status: ACTIVE | COMMUNITY

## 2020-12-11 RX ORDER — ALBUTEROL SULFATE 2.5 MG/3ML
3 ML AS NEEDED SOLUTION RESPIRATORY (INHALATION)
Refills: 0 | Status: ACTIVE | COMMUNITY
Start: 2020-05-17

## 2020-12-11 RX ORDER — OMEPRAZOLE 20 MG/1
1 CAPSULE 30 MINUTES BEFORE MORNING MEAL CAPSULE, DELAYED RELEASE ORAL ONCE A DAY
Qty: 90 | Refills: 3 | Status: DISCONTINUED | COMMUNITY
Start: 2020-10-14

## 2020-12-11 NOTE — HPI-TODAY'S VISIT:
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  She was last seen 10/14/2020 following hernia repair by Dr. Carey.  She had stopped Omeprazole due to concern for long term side effects and had recurrent epigastric pain.  She was prescribed Omeprazole 20mg daily.  She reports resolution of symptoms.  She has breakthrough heartburn less than once a week.  Constipation is controlled with diet and water intake.  She denies any other abdominal symptoms.

## 2020-12-11 NOTE — HPI-TODAY'S VISIT:
Ms. 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 She was seen on December 30, 2019 for follow-up of an exacerbation of constipation which had resulted in hospitalization in [place], [place] in fall 2019 due to concern for a bowel obstruction. She had abdominal x-rays performed at the time of admission, ultimately completing a bowel purge with enemas. Her gabapentin was discontinued, and she was started on MiraLAX, which has been effective when used in combination with regular prune intake.  At her last visit, she was having 2-6 soft, occasionally loose stools each day. No blood per rectum. She reported associated excess gas and nausea, but denies vomiting. She also described some vague right sided abdominal pain, not affected by food ingestion or defecation. She is not certain whether this relates in some fashion to some compression fractures of her lower back, which had been recently remediated with injection kyphoplasty. Her reflux symptoms were controlled with daily use of omeprazole. Her weight was stable. A CT scan of the abdomen and pelvis was done on February 8, 2020 and revealed thoracolumbar levoscoliosis, spondylosis, a T11 vertebral wedge compression deformity, and a metallic right hip prosthesis. A midline ventral hernia was seen containing fat as well as the mid transverse colon in the epigastric area without CT findings of hernia strangulation related GI obstruction. No other abnormalities were noted. She was status post a right hemicolectomy. The patient returned on 6/26/20  with ongoing abdominal discomfort related to abdominal wall hernias. She felt that this was likely the source of her intermittent pain. Her bowels were moving regularly on MiraLAX and prune juice, and reflux symptoms were controlled with omeprazole. Plan 1. Referral to Dr. Jessee Carey for hernia repair 2. Continue MiraLAX and prune juice for constipation 3. Continue omeprazole for reflux 4. Follow up in 3 months She was seen again on 9//4/20.  At that time, she had recently undergone abdominal wall hernia repair by Dr. Carey.  He apparently found "9 hernias" surgery.  She has no specific acute complaints today.  She had stopped omeprazole after her last office visit, and began taking sucralfate because of concerns over long-term PPI therapy.  However, she has begun having burning epigastric pain once again, often postprandially.  This iwas her major complaint at her last visit.

## 2020-12-13 PROBLEM — 236069009: Status: ACTIVE | Noted: 2020-09-05

## 2021-01-21 ENCOUNTER — OFFICE VISIT (OUTPATIENT)
Dept: URBAN - METROPOLITAN AREA CLINIC 113 | Facility: CLINIC | Age: 86
End: 2021-01-21
Payer: MEDICARE

## 2021-01-21 VITALS
DIASTOLIC BLOOD PRESSURE: 72 MMHG | TEMPERATURE: 96.8 F | WEIGHT: 190 LBS | RESPIRATION RATE: 22 BRPM | BODY MASS INDEX: 28.14 KG/M2 | SYSTOLIC BLOOD PRESSURE: 135 MMHG | HEIGHT: 69 IN | HEART RATE: 66 BPM

## 2021-01-21 DIAGNOSIS — K21.9 GASTROESOPHAGEAL REFLUX DISEASE, ESOPHAGITIS PRESENCE NOT SPECIFIED: ICD-10-CM

## 2021-01-21 DIAGNOSIS — K59.09 CHRONIC CONSTIPATION: ICD-10-CM

## 2021-01-21 PROCEDURE — 99213 OFFICE O/P EST LOW 20 MIN: CPT | Performed by: INTERNAL MEDICINE

## 2021-01-21 RX ORDER — OMEPRAZOLE 40 MG/1
1 CAPSULE 30 MINUTES BEFORE MORNING MEAL CAPSULE, DELAYED RELEASE ORAL ONCE A DAY
Status: ACTIVE | COMMUNITY

## 2021-01-21 RX ORDER — OMEPRAZOLE 40 MG/1
1 CAPSULE 30 MINUTES BEFORE MORNING MEAL CAPSULE, DELAYED RELEASE ORAL ONCE A DAY
Qty: 30 | OUTPATIENT
Start: 2021-01-21

## 2021-01-21 RX ORDER — METOPROLOL TARTRATE 25 MG/1
1/2  TABLET WITH FOOD TABLET, FILM COATED ORAL TWICE A DAY
Refills: 0 | Status: ACTIVE | COMMUNITY

## 2021-01-21 RX ORDER — LEVOTHYROXINE SODIUM 25 UG/1
1 TABLET IN THE MORNING ON AN EMPTY STOMACH TABLET ORAL ONCE A DAY
Refills: 0 | Status: ACTIVE | COMMUNITY
Start: 2019-12-13

## 2021-01-21 RX ORDER — PRAMIPEXOLE DIHYDROCHLORIDE 1 MG/1
1 TABLET TABLET ORAL ONCE A DAY
Refills: 0 | Status: ACTIVE | COMMUNITY
Start: 2018-10-22

## 2021-01-21 RX ORDER — ALBUTEROL SULFATE 2.5 MG/3ML
3 ML AS NEEDED SOLUTION RESPIRATORY (INHALATION)
Refills: 0 | Status: ACTIVE | COMMUNITY
Start: 2020-05-17

## 2021-01-21 NOTE — HPI-TODAY'S VISIT:
Ms. 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 She was seen most recently on December 11, 2020 for follow-up.   She was hospitalized in [place], [place] in fall 2019 due to concern for a bowel obstruction. She had abdominal x-rays performed at the time of admission, ultimately completing a bowel purge with enemas. Her gabapentin was discontinued, and she was started on MiraLAX, which has been effective when used in combination with regular prune intake.   A CT scan of the abdomen and pelvis was done on February 8, 2020 and revealed thoracolumbar levoscoliosis, spondylosis, a T11 vertebral wedge compression deformity, and a metallic right hip prosthesis. A midline ventral hernia was seen containing fat as well as the mid transverse colon in the epigastric area without CT findings of hernia strangulation related GI obstruction. No other abnormalities were noted. She was status post a right hemicolectomy.  The patient returned on 6/26/20  with ongoing abdominal discomfort related to abdominal wall hernias. She felt that this was likely the source of her intermittent pain. Her bowels were moving regularly on MiraLAX and prune juice, and reflux symptoms were controlled with omeprazole. She was referred to Dr. Carey for abdominal wall hernia repair.  Dr. Carey apparently found "9 hernias"at the time of her surgery.  Her major complaint at the Time of her last visit was a burning epigastric pain, which have recurred after she stopped taking omeprazole and substitute Carafate.  She was subsequently placed back on omeprazole 40 mg daily, and her burning epigastric pain is now resolved.

## 2021-04-23 ENCOUNTER — OFFICE VISIT (OUTPATIENT)
Dept: URBAN - METROPOLITAN AREA CLINIC 113 | Facility: CLINIC | Age: 86
End: 2021-04-23

## 2021-06-09 ENCOUNTER — OFFICE VISIT (OUTPATIENT)
Dept: URBAN - METROPOLITAN AREA CLINIC 113 | Facility: CLINIC | Age: 86
End: 2021-06-09
Payer: MEDICARE

## 2021-06-09 VITALS
RESPIRATION RATE: 22 BRPM | HEIGHT: 69 IN | WEIGHT: 185 LBS | SYSTOLIC BLOOD PRESSURE: 150 MMHG | DIASTOLIC BLOOD PRESSURE: 71 MMHG | HEART RATE: 69 BPM | BODY MASS INDEX: 27.4 KG/M2 | TEMPERATURE: 96.7 F

## 2021-06-09 DIAGNOSIS — K21.9 GASTROESOPHAGEAL REFLUX DISEASE, ESOPHAGITIS PRESENCE NOT SPECIFIED: ICD-10-CM

## 2021-06-09 DIAGNOSIS — R19.7 DIARRHEA, UNSPECIFIED TYPE: ICD-10-CM

## 2021-06-09 PROCEDURE — 99213 OFFICE O/P EST LOW 20 MIN: CPT | Performed by: INTERNAL MEDICINE

## 2021-06-09 RX ORDER — ALBUTEROL SULFATE 2.5 MG/3ML
3 ML AS NEEDED SOLUTION RESPIRATORY (INHALATION)
Refills: 0 | Status: ACTIVE | COMMUNITY
Start: 2020-05-17

## 2021-06-09 RX ORDER — MONTELUKAST SODIUM 10 MG/1
1 TABLET TABLET, FILM COATED ORAL ONCE A DAY
Status: ACTIVE | COMMUNITY

## 2021-06-09 RX ORDER — ACETAMINOPHEN 500 MG
3 CAPSULES TABLET ORAL ONCE A DAY
Status: ACTIVE | COMMUNITY

## 2021-06-09 RX ORDER — OMEPRAZOLE 40 MG/1
1 CAPSULE 30 MINUTES BEFORE MORNING MEAL CAPSULE, DELAYED RELEASE ORAL ONCE A DAY
Status: ACTIVE | COMMUNITY

## 2021-06-09 RX ORDER — VANCOMYCIN HYDROCHLORIDE 125 MG/1
AS DIRECTED CAPSULE ORAL
Status: ACTIVE | COMMUNITY

## 2021-06-09 RX ORDER — METOPROLOL TARTRATE 25 MG/1
1/2  TABLET WITH FOOD TABLET, FILM COATED ORAL TWICE A DAY
Refills: 0 | Status: ACTIVE | COMMUNITY

## 2021-06-09 RX ORDER — OMEPRAZOLE 40 MG/1
1 CAPSULE 30 MINUTES BEFORE MORNING MEAL CAPSULE, DELAYED RELEASE ORAL ONCE A DAY
Qty: 30 | Status: ACTIVE | COMMUNITY
Start: 2021-01-21

## 2021-06-09 RX ORDER — OMEPRAZOLE 40 MG/1
1 CAPSULE 30 MINUTES BEFORE MORNING MEAL CAPSULE, DELAYED RELEASE ORAL ONCE A DAY
Qty: 30 | OUTPATIENT

## 2021-06-09 RX ORDER — LEVOTHYROXINE SODIUM 25 UG/1
1 TABLET IN THE MORNING ON AN EMPTY STOMACH TABLET ORAL ONCE A DAY
Refills: 0 | Status: ACTIVE | COMMUNITY
Start: 2019-12-13

## 2021-06-09 RX ORDER — PRAMIPEXOLE DIHYDROCHLORIDE 1 MG/1
1 TABLET TABLET ORAL ONCE A DAY
Refills: 0 | Status: ACTIVE | COMMUNITY
Start: 2018-10-22

## 2021-06-09 NOTE — PHYSICAL EXAM PSYCH:
SUBJECTIVE:  Patient is a 46 year old  male  who presents with purulent rhinnorhea, nasal congestion and sinus pressure and pain.  Onset 2, 3 weeks ago and the symptoms have been gradual and worsening.  He  has no history of: significant recurrent respiratory illnesses.  He  is a smoker.    Julian denies any chest pain, sob, palpitations or any other cardiovascular symptoms;  denies any respiratory distress or symptoms;  denies any gastrointestinal symptoms;  denies any genitourinary symptoms;  denies any headaches, visual changes or any other neurologic symptoms;      Pmnhx: none     Social History     Social History   • Marital status:      Spouse name: N/A   • Number of children: N/A   • Years of education: N/A     Occupational History   • Not on file.     Social History Main Topics   • Smoking status: Current Every Day Smoker   • Smokeless tobacco: None    • Alcohol use None    • Drug use: None    • Sexual activity: Yes            Current Outpatient Prescriptions   Medication Sig Dispense Refill   • vitamin - therapeutic multivitamins w/minerals (CENTRUM SILVER,THERA-M) Tab Take 1 tablet by mouth daily.       No current facility-administered medications for this visit.              PHYSICAL EXAM:    APPEARANCE:  healthy, alert, in no distress  HEAD: normocephalic. No masses, lesions, tenderness or abnormalities  EYES: Pupils equal, round reactive to light & accommodation, normal extraocular movements and fundoscopic examination normal  EARS: boggy bilateral tympanic membrane(s)  NOSE: edematous nasal mucosa, erethematous nasal mucosa, occluded with yellow and green nasal discharge and tenderness present in maxillary sinus areas  THROAT: yellow post nasal drip with minimal oropharyngeal redness;  NECK: Neck supple. No masses. No adenopathy.  no JVD;neg meningial signs  LUNGS: clear to auscultation and percussion  HEART: regular rate and rhythm, S1 and S2 normal and with no gallops or murmurs  EXTREMITIES:   normal mood with appropriate affect There is no edema.    ASSESSMENT:  Acute  Sinusitis    PLAN:  >Rest and increase activity as tolerated.  >if symptoms are not improved in 3days or if worsen should recheck immediately either with pmd/wic  >Tylenol/advil for fever prn.  >Medication as ordered below  >Warm saline gargles prn and liberal fluid intake  >Seek medical attention immediately in case of worsening of symptoms.

## 2021-06-09 NOTE — HPI-TODAY'S VISIT:
Ms. 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 She was seen most recently on January 21, 2021 for routine follow-up.   She was hospitalized in [place], [place] in fall 2019 due to concern for a bowel obstruction. She had abdominal x-rays performed at the time of admission, ultimately completing a bowel purge with enemas. Her gabapentin was discontinued, and she was started on MiraLAX, which has been effective when used in combination with regular prune intake.   A CT scan of the abdomen and pelvis was done on February 8, 2020 and revealed thoracolumbar levoscoliosis, spondylosis, a T11 vertebral wedge compression deformity, and a metallic right hip prosthesis. A midline ventral hernia was seen containing fat as well as the mid transverse colon in the epigastric area without CT findings of hernia strangulation related GI obstruction. No other abnormalities were noted. She was status post a right hemicolectomy.  The patient returned on 6/26/20  with ongoing abdominal discomfort related to abdominal wall hernias. She felt that this was likely the source of her intermittent pain. Her bowels were moving regularly on MiraLAX and prune juice, and reflux symptoms were controlled with omeprazole. She was referred to Dr. Carey for abdominal wall hernia repair.  Dr. Carey apparently found "9 hernias"at the time of her surgery.  Her major complaint at the Time of her last visit was a burning epigastric pain, which have recurred after she stopped taking omeprazole and substitute Carafate.  She was subsequently placed back on omeprazole 40 mg daily, and her burning epigastric pain has now resolved.   At the time of her last visit, she was doing well.  However, she recently received antibiotics for urinary tract infection, and began having problems with diarrhea.  She had stool studies done which are currently not available to me.  She tells me that the organism for Clostridium difficile was positive, although the C. difficile toxin was negative.  Diarrhea has now resolved, and she is seeing her appetite improved.  She is eating cheese and yogurt and taking a probiotic.  She does complain of having "no energy" at present.  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   Her weight is down 5 pounds from baseline.

## 2021-10-04 ENCOUNTER — OFFICE VISIT (OUTPATIENT)
Dept: URBAN - METROPOLITAN AREA CLINIC 113 | Facility: CLINIC | Age: 86
End: 2021-10-04
Payer: MEDICARE

## 2021-10-04 VITALS
WEIGHT: 169 LBS | TEMPERATURE: 97 F | RESPIRATION RATE: 20 BRPM | HEART RATE: 70 BPM | SYSTOLIC BLOOD PRESSURE: 135 MMHG | BODY MASS INDEX: 25.03 KG/M2 | HEIGHT: 69 IN | DIASTOLIC BLOOD PRESSURE: 68 MMHG

## 2021-10-04 DIAGNOSIS — R19.7 DIARRHEA, UNSPECIFIED TYPE: ICD-10-CM

## 2021-10-04 DIAGNOSIS — R63.4 WEIGHT LOSS: ICD-10-CM

## 2021-10-04 DIAGNOSIS — K21.9 GASTROESOPHAGEAL REFLUX DISEASE, ESOPHAGITIS PRESENCE NOT SPECIFIED: ICD-10-CM

## 2021-10-04 PROCEDURE — 99213 OFFICE O/P EST LOW 20 MIN: CPT | Performed by: INTERNAL MEDICINE

## 2021-10-04 RX ORDER — OMEPRAZOLE 40 MG/1
1 CAPSULE 30 MINUTES BEFORE MORNING MEAL CAPSULE, DELAYED RELEASE ORAL ONCE A DAY
Qty: 30 | OUTPATIENT

## 2021-10-04 RX ORDER — LEVOTHYROXINE SODIUM 25 UG/1
1 TABLET IN THE MORNING ON AN EMPTY STOMACH TABLET ORAL ONCE A DAY
Refills: 0 | Status: ACTIVE | COMMUNITY
Start: 2019-12-13

## 2021-10-04 RX ORDER — METOPROLOL TARTRATE 25 MG/1
1/2  TABLET WITH FOOD TABLET, FILM COATED ORAL TWICE A DAY
Refills: 0 | Status: ACTIVE | COMMUNITY

## 2021-10-04 RX ORDER — ACETAMINOPHEN 500 MG
3 CAPSULES TABLET ORAL ONCE A DAY
Status: DISCONTINUED | COMMUNITY

## 2021-10-04 RX ORDER — FUROSEMIDE 40 MG/1
1 TABLET TABLET ORAL ONCE A DAY
Status: ACTIVE | COMMUNITY

## 2021-10-04 RX ORDER — MONTELUKAST SODIUM 10 MG/1
1 TABLET TABLET, FILM COATED ORAL ONCE A DAY
Status: ACTIVE | COMMUNITY

## 2021-10-04 RX ORDER — OMEPRAZOLE 40 MG/1
1 CAPSULE 30 MINUTES BEFORE MORNING MEAL CAPSULE, DELAYED RELEASE ORAL ONCE A DAY
Qty: 30 | Status: DISCONTINUED | COMMUNITY

## 2021-10-04 RX ORDER — VANCOMYCIN HYDROCHLORIDE 125 MG/1
AS DIRECTED CAPSULE ORAL
Status: DISCONTINUED | COMMUNITY

## 2021-10-04 RX ORDER — OMEPRAZOLE 40 MG/1
1 CAPSULE 30 MINUTES BEFORE MORNING MEAL CAPSULE, DELAYED RELEASE ORAL ONCE A DAY
Status: ACTIVE | COMMUNITY

## 2021-10-04 RX ORDER — ALBUTEROL SULFATE 2.5 MG/3ML
3 ML AS NEEDED SOLUTION RESPIRATORY (INHALATION)
Refills: 0 | Status: ACTIVE | COMMUNITY
Start: 2020-05-17

## 2021-10-04 RX ORDER — PRAMIPEXOLE DIHYDROCHLORIDE 1 MG/1
1 TABLET TABLET ORAL ONCE A DAY
Refills: 0 | Status: ACTIVE | COMMUNITY
Start: 2018-10-22

## 2021-10-04 NOTE — HPI-TODAY'S VISIT:
Ms. 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 She was seen most recently on JJune 9, 2021 for routine follow-up.   She was hospitalized in [place], [place] in fall 2019 due to concern for a bowel obstruction. She had abdominal x-rays performed at the time of admission, ultimately completing a bowel purge with enemas. Her gabapentin was discontinued, and she was started on MiraLAX, which has been effective when used in combination with regular prune intake.   A CT scan of the abdomen and pelvis done on February 8, 2020 revealed thoracolumbar levoscoliosis, spondylosis, a T11 vertebral wedge compression deformity, and a metallic right hip prosthesis. A midline ventral hernia was seen containing fat as well as the mid transverse colon in the epigastric area without CT findings of hernia strangulation related GI obstruction. No other abnormalities were noted. She was status post a right hemicolectomy.  The patient returned on 6/26/20  with ongoing abdominal discomfort related to abdominal wall hernias. She felt that this was likely the source of her intermittent pain. Her bowels were moving regularly on MiraLAX and prune juice, and reflux symptoms were controlled with omeprazole. She was referred to Dr. Carey for abdominal wall hernia repair.  Dr. Carey apparently found "9 hernias"at the time of her surgery.  Her major complaint at the Time of her last visit was a burning epigastric pain, which have recurred after she stopped taking omeprazole and substitute Carafate.  She was subsequently placed back on omeprazole 40 mg daily, and her burning epigastric pain has now resolved.   At the time of her  January 2021 visit, she was doing well despite a recent bout of C difficile -associated diarrhea.  Diarrhea had resolved, and her appetite had improved.  She was eating cheese and yogurt and taking a probiotic. She has no specific GI issues today.  She does complain of a lack of energy, but attributes a lot of this to the stress of her her 90-year-old 's chronic illness.  He recently fell and broke his hip, and is now bedbound.  She did not think he will live very long.    Her weight is down 16 pounds from her prior baseline.

## 2022-01-31 ENCOUNTER — OFFICE VISIT (OUTPATIENT)
Dept: URBAN - METROPOLITAN AREA CLINIC 113 | Facility: CLINIC | Age: 87
End: 2022-01-31
Payer: MEDICARE

## 2022-01-31 VITALS
HEART RATE: 64 BPM | TEMPERATURE: 97.3 F | BODY MASS INDEX: 28.14 KG/M2 | RESPIRATION RATE: 22 BRPM | HEIGHT: 69 IN | SYSTOLIC BLOOD PRESSURE: 144 MMHG | WEIGHT: 190 LBS | DIASTOLIC BLOOD PRESSURE: 71 MMHG

## 2022-01-31 DIAGNOSIS — R63.4 WEIGHT LOSS: ICD-10-CM

## 2022-01-31 DIAGNOSIS — R19.7 DIARRHEA, UNSPECIFIED TYPE: ICD-10-CM

## 2022-01-31 DIAGNOSIS — K21.9 GASTROESOPHAGEAL REFLUX DISEASE, ESOPHAGITIS PRESENCE NOT SPECIFIED: ICD-10-CM

## 2022-01-31 PROBLEM — 235595009: Status: ACTIVE | Noted: 2020-09-05

## 2022-01-31 PROCEDURE — 99213 OFFICE O/P EST LOW 20 MIN: CPT | Performed by: INTERNAL MEDICINE

## 2022-01-31 RX ORDER — MONTELUKAST SODIUM 10 MG/1
1 TABLET TABLET, FILM COATED ORAL ONCE A DAY
Status: ACTIVE | COMMUNITY

## 2022-01-31 RX ORDER — OMEPRAZOLE 40 MG/1
1 CAPSULE 30 MINUTES BEFORE MORNING MEAL CAPSULE, DELAYED RELEASE ORAL ONCE A DAY
Qty: 30 | Status: ON HOLD | COMMUNITY

## 2022-01-31 RX ORDER — FUROSEMIDE 40 MG/1
1 TABLET TABLET ORAL ONCE A DAY
Status: ACTIVE | COMMUNITY

## 2022-01-31 RX ORDER — PRAMIPEXOLE DIHYDROCHLORIDE 1 MG/1
1 TABLET TABLET ORAL ONCE A DAY
Refills: 0 | Status: ACTIVE | COMMUNITY
Start: 2018-10-22

## 2022-01-31 RX ORDER — OMEPRAZOLE 40 MG/1
1 CAPSULE 30 MINUTES BEFORE MORNING MEAL CAPSULE, DELAYED RELEASE ORAL ONCE A DAY
Status: ACTIVE | COMMUNITY

## 2022-01-31 RX ORDER — LEVOTHYROXINE SODIUM 25 UG/1
1 TABLET IN THE MORNING ON AN EMPTY STOMACH TABLET ORAL ONCE A DAY
Refills: 0 | Status: ON HOLD | COMMUNITY
Start: 2019-12-13

## 2022-01-31 RX ORDER — ALBUTEROL SULFATE 2.5 MG/3ML
3 ML AS NEEDED SOLUTION RESPIRATORY (INHALATION)
Refills: 0 | Status: ON HOLD | COMMUNITY
Start: 2020-05-17

## 2022-01-31 RX ORDER — METOPROLOL TARTRATE 25 MG/1
1/2  TABLET WITH FOOD TABLET, FILM COATED ORAL TWICE A DAY
Refills: 0 | Status: ACTIVE | COMMUNITY

## 2022-01-31 RX ORDER — OMEPRAZOLE 40 MG/1
1 CAPSULE 30 MINUTES BEFORE MORNING MEAL CAPSULE, DELAYED RELEASE ORAL ONCE A DAY
Qty: 30 | OUTPATIENT

## 2022-01-31 NOTE — HPI-TODAY'S VISIT:
Ms. 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 She was seen most recently on October 4, 2021.   She was hospitalized in [place], [place] in fall 2019 due to concern for a bowel obstruction. She had abdominal x-rays performed at the time of admission, ultimately completing a bowel purge with enemas. Her gabapentin was discontinued, and she was started on MiraLAX, which has been effective when used in combination with regular prune intake.   A CT scan of the abdomen and pelvis done on February 8, 2020 revealed thoracolumbar levoscoliosis, spondylosis, a T11 vertebral wedge compression deformity, and a metallic right hip prosthesis. A midline ventral hernia was seen containing fat as well as the mid transverse colon in the epigastric area without CT findings of hernia strangulation related GI obstruction. No other abnormalities were noted. She was status post a right hemicolectomy.  The patient returned on 6/26/20  with ongoing abdominal discomfort related to abdominal wall hernias. She felt that this was likely the source of her intermittent pain. Her bowels were moving regularly on MiraLAX and prune juice, and reflux symptoms were controlled with omeprazole. She was referred to Dr. Carey for abdominal wall hernia repair.  Dr. Carey apparently found "9 hernias"at the time of her surgery.  Her major complaint at the Time of her last visit was a burning epigastric pain, which have recurred after she stopped taking omeprazole and substitute Carafate.  She was subsequently placed back on omeprazole 40 mg daily, and her burning epigastric pain has now resolved.   At the time of her  January 2021 visit, she was doing well despite a recent bout of C difficile -associated diarrhea.  Diarrhea had resolved, and her appetite had improved.  She was eating cheese and yogurt and taking a probiotic.   She had no specific GI issues at the time of her visit here on 10/4/21.  She does complain of a lack of energy, but attributed a lot of this to the stress of her her 90-year-old 's chronic illness.  He recently had fallen, broken his hip, and was bedbound.  She did not think he will live very long.   Her weight was down 16 pounds from her prior baseline.  Her  has since passed away.  The patient recently moved into an assisted living facility after her 's death, and although she complains of fatigue, she has no specific GI issues. She has actually gained 21 pounds since her last office visit.

## 2023-01-25 ENCOUNTER — TELEPHONE ENCOUNTER (OUTPATIENT)
Dept: URBAN - METROPOLITAN AREA CLINIC 113 | Facility: CLINIC | Age: 88
End: 2023-01-25

## 2023-01-27 ENCOUNTER — CLAIMS CREATED FROM THE CLAIM WINDOW (OUTPATIENT)
Dept: URBAN - METROPOLITAN AREA CLINIC 113 | Facility: CLINIC | Age: 88
End: 2023-01-27
Payer: MEDICARE

## 2023-01-27 VITALS — HEIGHT: 69 IN | RESPIRATION RATE: 16 BRPM

## 2023-01-27 DIAGNOSIS — K62.5 BRIGHT RED BLOOD PER RECTUM: ICD-10-CM

## 2023-01-27 DIAGNOSIS — R19.5 LOOSE STOOLS: ICD-10-CM

## 2023-01-27 PROCEDURE — 99214 OFFICE O/P EST MOD 30 MIN: CPT | Performed by: NURSE PRACTITIONER

## 2023-01-27 RX ORDER — AZELASTINE HYDROCHLORIDE 137 UG/1
1 PUFF IN EACH NOSTRIL SPRAY, METERED NASAL TWICE A DAY
Status: ACTIVE | COMMUNITY

## 2023-01-27 RX ORDER — PRAMIPEXOLE DIHYDROCHLORIDE 1 MG/1
1 TABLET TABLET ORAL ONCE A DAY
Refills: 0 | Status: ACTIVE | COMMUNITY
Start: 2018-10-22

## 2023-01-27 RX ORDER — OMEPRAZOLE 40 MG/1
1 CAPSULE 30 MINUTES BEFORE MORNING MEAL CAPSULE, DELAYED RELEASE ORAL ONCE A DAY
Qty: 30 | Status: ACTIVE | COMMUNITY

## 2023-01-27 RX ORDER — AMLODIPINE BESYLATE 2.5 MG/1
1 TABLET TABLET ORAL ONCE A DAY
Status: ACTIVE | COMMUNITY

## 2023-01-27 RX ORDER — LETROZOLE 2.5 MG/1
1 TABLET TABLET, FILM COATED ORAL ONCE A DAY
Status: ACTIVE | COMMUNITY

## 2023-01-27 RX ORDER — ALBUTEROL SULFATE 2.5 MG/3ML
3 ML AS NEEDED SOLUTION RESPIRATORY (INHALATION)
Refills: 0 | Status: ON HOLD | COMMUNITY
Start: 2020-05-17

## 2023-01-27 RX ORDER — LEVOTHYROXINE SODIUM 25 UG/1
1 TABLET IN THE MORNING ON AN EMPTY STOMACH TABLET ORAL ONCE A DAY
Refills: 0 | Status: ON HOLD | COMMUNITY
Start: 2019-12-13

## 2023-01-27 RX ORDER — MULTIVITAMIN WITH IRON
AS DIRECTED TABLET ORAL
Status: ACTIVE | COMMUNITY

## 2023-01-27 RX ORDER — METOPROLOL TARTRATE 25 MG/1
1 TABLET WITH FOOD TABLET, FILM COATED ORAL TWICE A DAY
Refills: 0 | Status: ACTIVE | COMMUNITY

## 2023-01-27 RX ORDER — FUROSEMIDE 40 MG/1
1 TABLET TABLET ORAL ONCE A DAY
Status: ACTIVE | COMMUNITY

## 2023-01-27 RX ORDER — MONTELUKAST SODIUM 10 MG/1
1 TABLET TABLET, FILM COATED ORAL ONCE A DAY
Status: ACTIVE | COMMUNITY

## 2023-01-27 NOTE — HPI-OTHER HISTORIES
Colonoscopy 1/9/2018:BB PS 9, sigmoid and descending diverticulosis, patent end-to-side ileocolonic anastomosis characterized by healthy-appearing mucosa, erythematous mucosa in the entire colon status post biopsy, removal of a benign-appearing 3 mm rectal polyp.  Colon biopsies demonstrated focal active colitis and rectal polyp was hyperplastic.

## 2023-01-27 NOTE — PHYSICAL EXAM RECTAL:
no external hemorrhoids , no masses palpable , no melena , no red blood , No tenderness on MIYA , tone decreased

## 2023-01-27 NOTE — HPI-TODAY'S VISIT:
This is an 86-year-old female with a history of GERD, chronic constipation, T3 N0 M0 moderately differentiated adenocarcinoma of the colon (age 75) status post right hemicolectomy presenting for evaluation of rectal bleeding. She was last seen in the office 1/31/2022.  GERD was controlled with omeprazole 40 mg daily which she was to continue.  She had previously experienced diarrhea that had resolved.  She had lost weight.  It was discussed this was likely associated with the stress of her 's illness.  She had gained 21 pounds since her prior visit and symptoms had resolved. On 1/25, in the evening, she had a bowel movement and noticed a small amount of red blood on the tissue.  She denies a hard stool or straining.  She denies associated proctalgia.  This may have recurred last night, she is not entirely clear.  She has been having 0-3 loose bowel movements per day.  Bleeding has not been an issue in the past.  She has occasional gas pain in her abdomen.  Heartburn is controlled with omeprazole.  She denies any other abdominal symptoms. She provides a copy of a CBC performed 11/22/2022:WBC 7.35, hemoglobin 14.1, MCV 90.3, platelets 307.

## 2023-01-28 PROBLEM — 398032003: Status: ACTIVE | Noted: 2023-01-28

## 2023-01-28 PROBLEM — 74474003: Status: ACTIVE | Noted: 2023-01-28

## 2023-02-07 ENCOUNTER — TELEPHONE ENCOUNTER (OUTPATIENT)
Dept: URBAN - METROPOLITAN AREA CLINIC 113 | Facility: CLINIC | Age: 88
End: 2023-02-07

## 2023-04-10 ENCOUNTER — OFFICE VISIT (OUTPATIENT)
Dept: URBAN - METROPOLITAN AREA CLINIC 113 | Facility: CLINIC | Age: 88
End: 2023-04-10
Payer: MEDICARE

## 2023-04-10 VITALS
DIASTOLIC BLOOD PRESSURE: 73 MMHG | BODY MASS INDEX: 28.61 KG/M2 | HEIGHT: 69 IN | HEART RATE: 73 BPM | TEMPERATURE: 97.3 F | WEIGHT: 193.2 LBS | SYSTOLIC BLOOD PRESSURE: 163 MMHG | RESPIRATION RATE: 18 BRPM

## 2023-04-10 DIAGNOSIS — K62.5 BRIGHT RED BLOOD PER RECTUM: ICD-10-CM

## 2023-04-10 DIAGNOSIS — R19.5 LOOSE STOOLS: ICD-10-CM

## 2023-04-10 PROCEDURE — 99213 OFFICE O/P EST LOW 20 MIN: CPT | Performed by: INTERNAL MEDICINE

## 2023-04-10 RX ORDER — AMLODIPINE BESYLATE 2.5 MG/1
1 TABLET TABLET ORAL ONCE A DAY
Status: ACTIVE | COMMUNITY

## 2023-04-10 RX ORDER — FUROSEMIDE 40 MG/1
1 TABLET TABLET ORAL ONCE A DAY
Status: ACTIVE | COMMUNITY

## 2023-04-10 RX ORDER — AZELASTINE HYDROCHLORIDE 137 UG/1
1 PUFF IN EACH NOSTRIL SPRAY, METERED NASAL TWICE A DAY
Status: ACTIVE | COMMUNITY

## 2023-04-10 RX ORDER — MONTELUKAST SODIUM 10 MG/1
1 TABLET TABLET, FILM COATED ORAL ONCE A DAY
Status: ACTIVE | COMMUNITY

## 2023-04-10 RX ORDER — METOPROLOL TARTRATE 25 MG/1
1 TABLET WITH FOOD TABLET, FILM COATED ORAL TWICE A DAY
Refills: 0 | Status: ACTIVE | COMMUNITY

## 2023-04-10 RX ORDER — LETROZOLE 2.5 MG/1
1 TABLET TABLET, FILM COATED ORAL ONCE A DAY
Status: ON HOLD | COMMUNITY

## 2023-04-10 RX ORDER — MULTIVITAMIN WITH IRON
AS DIRECTED TABLET ORAL
Status: ON HOLD | COMMUNITY

## 2023-04-10 RX ORDER — LEVOTHYROXINE SODIUM 25 UG/1
1 TABLET IN THE MORNING ON AN EMPTY STOMACH TABLET ORAL ONCE A DAY
Refills: 0 | Status: ON HOLD | COMMUNITY
Start: 2019-12-13

## 2023-04-10 RX ORDER — OMEPRAZOLE 40 MG/1
1 CAPSULE 30 MINUTES BEFORE MORNING MEAL CAPSULE, DELAYED RELEASE ORAL ONCE A DAY
Qty: 30 | Status: ACTIVE | COMMUNITY

## 2023-04-10 RX ORDER — PRAMIPEXOLE DIHYDROCHLORIDE 1 MG/1
1 TABLET TABLET ORAL ONCE A DAY
Refills: 0 | Status: ACTIVE | COMMUNITY
Start: 2018-10-22

## 2023-04-10 RX ORDER — ALBUTEROL SULFATE 2.5 MG/3ML
3 ML AS NEEDED SOLUTION RESPIRATORY (INHALATION)
Refills: 0 | Status: ON HOLD | COMMUNITY
Start: 2020-05-17

## 2023-04-10 NOTE — HPI-TODAY'S VISIT:
This is an 86-year-old female with a history of GERD, chronic constipation, T3 N0 M0 moderately differentiated adenocarcinoma of the colon (age 75) status post right hemicolectomy presenting for follow up evaluation of rectal bleeding. She was last seen here on 1/22/23.  She was previously seen in the office 1/31/2022.  GERD was controlled with omeprazole 40 mg daily which she was to continue.  She had previously experienced diarrhea that had resolved.  She had lost weight.  It was discussed this was likely associated with the stress of her 's illness.  She had gained 21 pounds since her prior visit and symptoms had resolved.  On 1/25/23, in the evening, she had a bowel movement and noticed a small amount of red blood on the tissue.  She denies a hard stool or straining.  She denies associated proctalgia.  This may have recurred last night, she is not entirely clear.  She has been having 0-3 loose bowel movements per day.  Bleeding has not been an issue in the past.  She has occasional gas pain in her abdomen.  Heartburn is controlled with omeprazole.  She denies any other abdominal symptoms.  She provides a copy of a CBC performed 11/22/2022:WBC 7.35, hemoglobin 14.1, MCV 90.3, platelets 307.  After her last visit, she was placed on a fiber supplement, told to use Preparation H for a week, and to call if there is any recurrent bleeding or persistent bleeding in order to consider having a repeat colonoscopy done.  Her last colonoscopic examination was in January 2018.  The patient has had no further rectal bleeding.  She denies abdominal pain or any change in bowel habits.  She had a recent cough and was placed on some prednisone by her PCP which resolved this.  She is doing generally well overall.

## 2023-07-13 ENCOUNTER — OFFICE VISIT (OUTPATIENT)
Dept: URBAN - METROPOLITAN AREA CLINIC 113 | Facility: CLINIC | Age: 88
End: 2023-07-13

## 2023-07-13 RX ORDER — FUROSEMIDE 40 MG/1
1 TABLET TABLET ORAL ONCE A DAY
Status: ACTIVE | COMMUNITY

## 2023-07-13 RX ORDER — LETROZOLE 2.5 MG/1
1 TABLET TABLET, FILM COATED ORAL ONCE A DAY
Status: ON HOLD | COMMUNITY

## 2023-07-13 RX ORDER — AMLODIPINE BESYLATE 2.5 MG/1
1 TABLET TABLET ORAL ONCE A DAY
Status: ACTIVE | COMMUNITY

## 2023-07-13 RX ORDER — MULTIVITAMIN WITH IRON
AS DIRECTED TABLET ORAL
Status: ON HOLD | COMMUNITY

## 2023-07-13 RX ORDER — LEVOTHYROXINE SODIUM 25 UG/1
1 TABLET IN THE MORNING ON AN EMPTY STOMACH TABLET ORAL ONCE A DAY
Refills: 0 | Status: ON HOLD | COMMUNITY
Start: 2019-12-13

## 2023-07-13 RX ORDER — ALBUTEROL SULFATE 2.5 MG/3ML
3 ML AS NEEDED SOLUTION RESPIRATORY (INHALATION)
Refills: 0 | Status: ON HOLD | COMMUNITY
Start: 2020-05-17

## 2023-07-13 RX ORDER — MONTELUKAST SODIUM 10 MG/1
1 TABLET TABLET, FILM COATED ORAL ONCE A DAY
Status: ACTIVE | COMMUNITY

## 2023-07-13 RX ORDER — PRAMIPEXOLE DIHYDROCHLORIDE 1 MG/1
1 TABLET TABLET ORAL ONCE A DAY
Refills: 0 | Status: ACTIVE | COMMUNITY
Start: 2018-10-22

## 2023-07-13 RX ORDER — OMEPRAZOLE 40 MG/1
1 CAPSULE 30 MINUTES BEFORE MORNING MEAL CAPSULE, DELAYED RELEASE ORAL ONCE A DAY
Qty: 30 | Status: ACTIVE | COMMUNITY

## 2023-07-13 RX ORDER — AZELASTINE HYDROCHLORIDE 137 UG/1
1 PUFF IN EACH NOSTRIL SPRAY, METERED NASAL TWICE A DAY
Status: ACTIVE | COMMUNITY

## 2023-07-13 RX ORDER — METOPROLOL TARTRATE 25 MG/1
1 TABLET WITH FOOD TABLET, FILM COATED ORAL TWICE A DAY
Refills: 0 | Status: ACTIVE | COMMUNITY

## 2023-07-13 NOTE — HPI-TODAY'S VISIT:
This is an 88-year-old female with a history of GERD, chronic constipation, T3 N0 M0 moderately differentiated adenocarcinoma of the colon (age 75) status post right hemicolectomy presenting for follow up evaluation of rectal bleeding. She was last seen here on 4/10/23.  She was previously seen in the office 1/31/2022.  GERD was controlled with omeprazole 40 mg daily which she was to continue.  She had previously experienced diarrhea that had resolved.  She had lost weight.  It was discussed this was likely associated with the stress of her 's illness.  She had gained 21 pounds since her prior visit and symptoms had resolved.  On 1/25/23, in the evening, she had a bowel movement and noticed a small amount of red blood on the tissue.  She denies a hard stool or straining.  She denies associated proctalgia.  This may have recurred last night, she is not entirely clear.  She has been having 0-3 loose bowel movements per day.  Bleeding has not been an issue in the past.  She has occasional gas pain in her abdomen.  Heartburn is controlled with omeprazole.  She denies any other abdominal symptoms.  She provides a copy of a CBC performed 11/22/2022:WBC 7.35, hemoglobin 14.1, MCV 90.3, platelets 307.  After her 1/22/23 visit, she was placed on a fiber supplement, told to use Preparation H for a week, and to call if there is any recurrent bleeding or persistent bleeding in order to consider having a repeat colonoscopy done.  Her last colonoscopic examination was in January 2018.  At the time of her 4/10/23 office viist, she had no further rectal bleeding.  She denied abdominal pain or any change in bowel habits.   She was doing generally well overall.

## 2023-10-05 ENCOUNTER — OFFICE VISIT (OUTPATIENT)
Dept: URBAN - METROPOLITAN AREA CLINIC 113 | Facility: CLINIC | Age: 88
End: 2023-10-05
Payer: MEDICARE

## 2023-10-05 ENCOUNTER — DASHBOARD ENCOUNTERS (OUTPATIENT)
Age: 88
End: 2023-10-05

## 2023-10-05 VITALS
DIASTOLIC BLOOD PRESSURE: 69 MMHG | HEIGHT: 69 IN | WEIGHT: 185 LBS | BODY MASS INDEX: 27.4 KG/M2 | TEMPERATURE: 97.5 F | RESPIRATION RATE: 18 BRPM | HEART RATE: 73 BPM | SYSTOLIC BLOOD PRESSURE: 133 MMHG

## 2023-10-05 DIAGNOSIS — K21.9 GASTROESOPHAGEAL REFLUX DISEASE WITHOUT ESOPHAGITIS: ICD-10-CM

## 2023-10-05 PROCEDURE — 99203 OFFICE O/P NEW LOW 30 MIN: CPT | Performed by: NURSE PRACTITIONER

## 2023-10-05 RX ORDER — ALBUTEROL SULFATE 90 UG/1
1 PUFF AS NEEDED AEROSOL, METERED RESPIRATORY (INHALATION)
Status: ACTIVE | COMMUNITY

## 2023-10-05 RX ORDER — ALBUTEROL SULFATE 2.5 MG/3ML
3 ML AS NEEDED SOLUTION RESPIRATORY (INHALATION)
Refills: 0 | Status: ON HOLD | COMMUNITY
Start: 2020-05-17

## 2023-10-05 RX ORDER — AZELASTINE HYDROCHLORIDE 137 UG/1
1 PUFF IN EACH NOSTRIL SPRAY, METERED NASAL TWICE A DAY
Status: ACTIVE | COMMUNITY

## 2023-10-05 RX ORDER — OMEPRAZOLE 40 MG/1
1 CAPSULE 30 MINUTES BEFORE MORNING MEAL CAPSULE, DELAYED RELEASE ORAL ONCE A DAY
Qty: 30 | Status: ACTIVE | COMMUNITY

## 2023-10-05 RX ORDER — LETROZOLE 2.5 MG/1
1 TABLET TABLET, FILM COATED ORAL ONCE A DAY
Status: ON HOLD | COMMUNITY

## 2023-10-05 RX ORDER — METOPROLOL TARTRATE 25 MG/1
1 TABLET WITH FOOD TABLET, FILM COATED ORAL TWICE A DAY
Refills: 0 | Status: ACTIVE | COMMUNITY

## 2023-10-05 RX ORDER — PRAMIPEXOLE DIHYDROCHLORIDE 1 MG/1
1 TABLET TABLET ORAL ONCE A DAY
Refills: 0 | Status: ACTIVE | COMMUNITY
Start: 2018-10-22

## 2023-10-05 RX ORDER — AMLODIPINE BESYLATE 2.5 MG/1
1 TABLET TABLET ORAL ONCE A DAY
Status: ACTIVE | COMMUNITY

## 2023-10-05 RX ORDER — LEVOTHYROXINE SODIUM 25 UG/1
1 TABLET IN THE MORNING ON AN EMPTY STOMACH TABLET ORAL ONCE A DAY
Refills: 0 | Status: ON HOLD | COMMUNITY
Start: 2019-12-13

## 2023-10-05 RX ORDER — MONTELUKAST SODIUM 10 MG/1
1 TABLET TABLET, FILM COATED ORAL ONCE A DAY
Status: ACTIVE | COMMUNITY

## 2023-10-05 RX ORDER — FUROSEMIDE 40 MG/1
1 TABLET TABLET ORAL ONCE A DAY
Status: ACTIVE | COMMUNITY

## 2023-10-05 RX ORDER — MULTIVITAMIN WITH IRON
AS DIRECTED TABLET ORAL
Status: ON HOLD | COMMUNITY

## 2023-10-05 NOTE — HPI-TODAY'S VISIT:
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 She was last seen in the office 4/10/2023.  She had previously experienced red blood per rectum.  At a prior visit, hemorrhoids were suspected.  She had used Preparation H for a week.  Bleeding had resolved.  She was generally doing well.  She was to continue Benefiber 2 tablespoons daily.  Colonoscopy was deferred at the time.  Loose stools had also improved with Benefiber.  She was to continue omeprazole 40 mg daily for acid reflux. Acid reflux is controlled with omeprazole 40 mg daily.  She has famotidine on hand but is not taking it.  She denies dysphagia or abdominal pain.  She has a bowel movement every other day.  She denies constipation.  She denies other abdominal symptoms.  She has voluntarily lost weight since her last visit.  Our records reflect a 9 pound weight loss. Since her last visit, she was diagnosed with breast cancer and underwent a lumpectomy.  She is on medication daily that starts with an ED and cannot recall what she is taking.  She is also been diagnosed with carpal tunnel in her right wrist.  Surgery is planned in 1 week. She reports a history of pneumonia and states that she has crackles still in her lungs. She has follow up with Dr. Bowden in 2 weeks.

## 2023-10-05 NOTE — PHYSICAL EXAM GASTROINTESTINAL
Abdomen , soft, nontender, nondistended , no guarding or rigidity , no masses palpable , normal bowel sounds , Liver and Spleen , no hepatosplenomegaly; limited due to patient position

## 2024-05-06 ENCOUNTER — TELEPHONE ENCOUNTER (OUTPATIENT)
Dept: URBAN - METROPOLITAN AREA CLINIC 113 | Facility: CLINIC | Age: 89
End: 2024-05-06

## 2024-09-03 ENCOUNTER — OFFICE VISIT (OUTPATIENT)
Dept: URBAN - METROPOLITAN AREA CLINIC 113 | Facility: CLINIC | Age: 89
End: 2024-09-03

## 2024-09-03 RX ORDER — AZELASTINE 1 MG/ML
1 PUFF IN EACH NOSTRIL SPRAY, METERED NASAL TWICE A DAY
Status: ACTIVE | COMMUNITY

## 2024-09-03 RX ORDER — ALBUTEROL SULFATE 2.5 MG/3ML
3 ML AS NEEDED SOLUTION RESPIRATORY (INHALATION)
Refills: 0 | Status: ON HOLD | COMMUNITY
Start: 2020-05-17

## 2024-09-03 RX ORDER — LEVOTHYROXINE SODIUM 25 UG/1
1 TABLET IN THE MORNING ON AN EMPTY STOMACH TABLET ORAL ONCE A DAY
Refills: 0 | Status: ON HOLD | COMMUNITY
Start: 2019-12-13

## 2024-09-03 RX ORDER — OMEPRAZOLE 40 MG/1
1 CAPSULE 30 MINUTES BEFORE MORNING MEAL CAPSULE, DELAYED RELEASE ORAL ONCE A DAY
Qty: 30 | Status: ACTIVE | COMMUNITY

## 2024-09-03 RX ORDER — PRAMIPEXOLE DIHYDROCHLORIDE 1 MG/1
1 TABLET TABLET ORAL ONCE A DAY
Refills: 0 | Status: ACTIVE | COMMUNITY
Start: 2018-10-22

## 2024-09-03 RX ORDER — FUROSEMIDE 40 MG/1
1 TABLET TABLET ORAL ONCE A DAY
Status: ACTIVE | COMMUNITY

## 2024-09-03 RX ORDER — MONTELUKAST SODIUM 10 MG/1
1 TABLET TABLET, FILM COATED ORAL ONCE A DAY
Status: ACTIVE | COMMUNITY

## 2024-09-03 RX ORDER — LETROZOLE 2.5 MG/1
1 TABLET TABLET, FILM COATED ORAL ONCE A DAY
Status: ON HOLD | COMMUNITY

## 2024-09-03 RX ORDER — MULTIVITAMIN WITH IRON
AS DIRECTED TABLET ORAL
Status: ON HOLD | COMMUNITY

## 2024-09-03 RX ORDER — METOPROLOL TARTRATE 25 MG/1
1 TABLET WITH FOOD TABLET, FILM COATED ORAL TWICE A DAY
Refills: 0 | Status: ACTIVE | COMMUNITY

## 2024-09-03 RX ORDER — ALBUTEROL SULFATE 90 UG/1
1 PUFF AS NEEDED AEROSOL, METERED RESPIRATORY (INHALATION)
Status: ACTIVE | COMMUNITY

## 2024-09-03 RX ORDER — AMLODIPINE BESYLATE 2.5 MG/1
1 TABLET TABLET ORAL ONCE A DAY
Status: ACTIVE | COMMUNITY

## 2024-09-03 NOTE — HPI-TODAY'S VISIT:
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 She was last seen in the office 10/15/23.   She had previously experienced red blood per rectum.  At a prior visit, hemorrhoids were suspected.  She had used Preparation H for a week.  Bleeding had resolved.  She was generally doing well.  She was to continue Benefiber 2 tablespoons daily.  Colonoscopy was deferred at the time.  Loose stools had also improved with Benefiber.  She was to continue omeprazole 40 mg daily for acid reflux.  Acid reflux is controlled with omeprazole 40 mg daily.  She has famotidine on hand but is not taking it.  She denies dysphagia or abdominal pain.  She has a bowel movement every other day.  She denies constipation.  She denies other abdominal symptoms.  She has voluntarily lost weight since her last visit.  Our records reflect a 9 pound weight loss. Since her last visit, she was diagnosed with breast cancer and underwent a lumpectomy.  She is on medication daily that starts with an ED and cannot recall what she is taking.  She is also been diagnosed with carpal tunnel in her right wrist.  Surgery is planned in 1 week.